# Patient Record
Sex: FEMALE | Race: WHITE | NOT HISPANIC OR LATINO | URBAN - METROPOLITAN AREA
[De-identification: names, ages, dates, MRNs, and addresses within clinical notes are randomized per-mention and may not be internally consistent; named-entity substitution may affect disease eponyms.]

---

## 2020-01-01 ENCOUNTER — HOSPITAL ENCOUNTER (INPATIENT)
Facility: HOSPITAL | Age: 80
LOS: 10 days | DRG: 177 | End: 2020-10-18
Attending: HOSPITALIST | Admitting: HOSPITALIST
Payer: MEDICARE

## 2020-01-01 VITALS
WEIGHT: 134.06 LBS | SYSTOLIC BLOOD PRESSURE: 61 MMHG | RESPIRATION RATE: 18 BRPM | HEIGHT: 64 IN | BODY MASS INDEX: 22.89 KG/M2 | OXYGEN SATURATION: 94 % | HEART RATE: 65 BPM | DIASTOLIC BLOOD PRESSURE: 25 MMHG | TEMPERATURE: 32 F

## 2020-01-01 DIAGNOSIS — Z71.89 GOALS OF CARE, COUNSELING/DISCUSSION: ICD-10-CM

## 2020-01-01 DIAGNOSIS — U07.1 PNEUMONIA DUE TO COVID-19 VIRUS: ICD-10-CM

## 2020-01-01 DIAGNOSIS — Z51.5 PALLIATIVE CARE ENCOUNTER: ICD-10-CM

## 2020-01-01 DIAGNOSIS — Z71.89 ADVANCE CARE PLANNING: ICD-10-CM

## 2020-01-01 DIAGNOSIS — J12.82 PNEUMONIA DUE TO COVID-19 VIRUS: ICD-10-CM

## 2020-01-01 DIAGNOSIS — R07.9 CHEST PAIN: ICD-10-CM

## 2020-01-01 DIAGNOSIS — R00.0 TACHYCARDIA: ICD-10-CM

## 2020-01-01 DIAGNOSIS — U07.1 COVID-19: ICD-10-CM

## 2020-01-01 DIAGNOSIS — J96.01 ACUTE RESPIRATORY FAILURE WITH HYPOXIA: ICD-10-CM

## 2020-01-01 LAB
ABO + RH BLD: NORMAL
ALBUMIN SERPL BCP-MCNC: 1.6 G/DL (ref 3.5–5.2)
ALBUMIN SERPL BCP-MCNC: 1.7 G/DL (ref 3.5–5.2)
ALBUMIN SERPL BCP-MCNC: 1.8 G/DL (ref 3.5–5.2)
ALBUMIN SERPL BCP-MCNC: 1.9 G/DL (ref 3.5–5.2)
ALBUMIN SERPL BCP-MCNC: 2 G/DL (ref 3.5–5.2)
ALBUMIN SERPL BCP-MCNC: 2 G/DL (ref 3.5–5.2)
ALBUMIN SERPL BCP-MCNC: 2.1 G/DL (ref 3.5–5.2)
ALLENS TEST: ABNORMAL
ALLENS TEST: ABNORMAL
ALP SERPL-CCNC: 145 U/L (ref 55–135)
ALP SERPL-CCNC: 151 U/L (ref 55–135)
ALP SERPL-CCNC: 153 U/L (ref 55–135)
ALP SERPL-CCNC: 169 U/L (ref 55–135)
ALP SERPL-CCNC: 182 U/L (ref 55–135)
ALP SERPL-CCNC: 216 U/L (ref 55–135)
ALP SERPL-CCNC: 225 U/L (ref 55–135)
ALP SERPL-CCNC: 274 U/L (ref 55–135)
ALP SERPL-CCNC: 277 U/L (ref 55–135)
ALT SERPL W/O P-5'-P-CCNC: 12 U/L (ref 10–44)
ALT SERPL W/O P-5'-P-CCNC: 12 U/L (ref 10–44)
ALT SERPL W/O P-5'-P-CCNC: 13 U/L (ref 10–44)
ALT SERPL W/O P-5'-P-CCNC: 13 U/L (ref 10–44)
ALT SERPL W/O P-5'-P-CCNC: 15 U/L (ref 10–44)
ALT SERPL W/O P-5'-P-CCNC: 16 U/L (ref 10–44)
ALT SERPL W/O P-5'-P-CCNC: 19 U/L (ref 10–44)
ANION GAP SERPL CALC-SCNC: 10 MMOL/L (ref 8–16)
ANION GAP SERPL CALC-SCNC: 11 MMOL/L (ref 8–16)
ANION GAP SERPL CALC-SCNC: 12 MMOL/L (ref 8–16)
ANION GAP SERPL CALC-SCNC: 13 MMOL/L (ref 8–16)
ANION GAP SERPL CALC-SCNC: 13 MMOL/L (ref 8–16)
ANION GAP SERPL CALC-SCNC: 14 MMOL/L (ref 8–16)
ANION GAP SERPL CALC-SCNC: 17 MMOL/L (ref 8–16)
ANION GAP SERPL CALC-SCNC: 18 MMOL/L (ref 8–16)
ANION GAP SERPL CALC-SCNC: 9 MMOL/L (ref 8–16)
ANISOCYTOSIS BLD QL SMEAR: SLIGHT
ASCENDING AORTA: 2.72 CM
AST SERPL-CCNC: 20 U/L (ref 10–40)
AST SERPL-CCNC: 20 U/L (ref 10–40)
AST SERPL-CCNC: 22 U/L (ref 10–40)
AST SERPL-CCNC: 25 U/L (ref 10–40)
AST SERPL-CCNC: 25 U/L (ref 10–40)
AST SERPL-CCNC: 27 U/L (ref 10–40)
AST SERPL-CCNC: 29 U/L (ref 10–40)
AST SERPL-CCNC: 34 U/L (ref 10–40)
AST SERPL-CCNC: 36 U/L (ref 10–40)
AV INDEX (PROSTH): 0.89
AV MEAN GRADIENT: 6 MMHG
AV PEAK GRADIENT: 12 MMHG
AV VALVE AREA: 2.81 CM2
AV VELOCITY RATIO: 0.71
BACTERIA BLD CULT: NORMAL
BASO STIPL BLD QL SMEAR: ABNORMAL
BASOPHILS # BLD AUTO: 0.04 K/UL (ref 0–0.2)
BASOPHILS # BLD AUTO: 0.17 K/UL (ref 0–0.2)
BASOPHILS # BLD AUTO: 0.19 K/UL (ref 0–0.2)
BASOPHILS # BLD AUTO: 0.2 K/UL (ref 0–0.2)
BASOPHILS # BLD AUTO: ABNORMAL K/UL (ref 0–0.2)
BASOPHILS NFR BLD: 0 % (ref 0–1.9)
BASOPHILS NFR BLD: 0.1 % (ref 0–1.9)
BASOPHILS NFR BLD: 0.3 % (ref 0–1.9)
BASOPHILS NFR BLD: 0.4 % (ref 0–1.9)
BASOPHILS NFR BLD: 0.4 % (ref 0–1.9)
BILIRUB SERPL-MCNC: 0.4 MG/DL (ref 0.1–1)
BILIRUB SERPL-MCNC: 0.5 MG/DL (ref 0.1–1)
BILIRUB SERPL-MCNC: 0.6 MG/DL (ref 0.1–1)
BILIRUB SERPL-MCNC: 0.7 MG/DL (ref 0.1–1)
BILIRUB SERPL-MCNC: 0.8 MG/DL (ref 0.1–1)
BLD GP AB SCN CELLS X3 SERPL QL: NORMAL
BNP SERPL-MCNC: 62 PG/ML (ref 0–99)
BSA FOR ECHO PROCEDURE: 1.66 M2
BUN SERPL-MCNC: 23 MG/DL (ref 8–23)
BUN SERPL-MCNC: 25 MG/DL (ref 8–23)
BUN SERPL-MCNC: 25 MG/DL (ref 8–23)
BUN SERPL-MCNC: 27 MG/DL (ref 8–23)
BUN SERPL-MCNC: 29 MG/DL (ref 8–23)
BUN SERPL-MCNC: 32 MG/DL (ref 8–23)
BUN SERPL-MCNC: 33 MG/DL (ref 8–23)
BUN SERPL-MCNC: 34 MG/DL (ref 8–23)
BUN SERPL-MCNC: 35 MG/DL (ref 8–23)
BURR CELLS BLD QL SMEAR: ABNORMAL
BURR CELLS BLD QL SMEAR: ABNORMAL
CALCIUM SERPL-MCNC: 6.7 MG/DL (ref 8.7–10.5)
CALCIUM SERPL-MCNC: 6.9 MG/DL (ref 8.7–10.5)
CALCIUM SERPL-MCNC: 7 MG/DL (ref 8.7–10.5)
CALCIUM SERPL-MCNC: 7.1 MG/DL (ref 8.7–10.5)
CALCIUM SERPL-MCNC: 7.2 MG/DL (ref 8.7–10.5)
CALCIUM SERPL-MCNC: 7.4 MG/DL (ref 8.7–10.5)
CHLORIDE SERPL-SCNC: 106 MMOL/L (ref 95–110)
CHLORIDE SERPL-SCNC: 107 MMOL/L (ref 95–110)
CHLORIDE SERPL-SCNC: 107 MMOL/L (ref 95–110)
CHLORIDE SERPL-SCNC: 108 MMOL/L (ref 95–110)
CHLORIDE SERPL-SCNC: 110 MMOL/L (ref 95–110)
CK SERPL-CCNC: 127 U/L (ref 20–180)
CO2 SERPL-SCNC: 20 MMOL/L (ref 23–29)
CO2 SERPL-SCNC: 21 MMOL/L (ref 23–29)
CO2 SERPL-SCNC: 21 MMOL/L (ref 23–29)
CO2 SERPL-SCNC: 22 MMOL/L (ref 23–29)
CO2 SERPL-SCNC: 24 MMOL/L (ref 23–29)
CO2 SERPL-SCNC: 27 MMOL/L (ref 23–29)
CREAT SERPL-MCNC: 0.5 MG/DL (ref 0.5–1.4)
CREAT SERPL-MCNC: 0.6 MG/DL (ref 0.5–1.4)
CREAT SERPL-MCNC: 0.7 MG/DL (ref 0.5–1.4)
CREAT SERPL-MCNC: 0.7 MG/DL (ref 0.5–1.4)
CRP SERPL-MCNC: 117.8 MG/L (ref 0–8.2)
CRP SERPL-MCNC: 82.8 MG/L (ref 0–8.2)
CRP SERPL-MCNC: 87.2 MG/L (ref 0–8.2)
CRP SERPL-MCNC: 92.2 MG/L (ref 0–8.2)
CV ECHO LV RWT: 0.3 CM
D DIMER PPP IA.FEU-MCNC: 2.93 MG/L FEU
D DIMER PPP IA.FEU-MCNC: 3.16 MG/L FEU
D DIMER PPP IA.FEU-MCNC: 3.71 MG/L FEU
D DIMER PPP IA.FEU-MCNC: 3.86 MG/L FEU
D DIMER PPP IA.FEU-MCNC: 5.59 MG/L FEU
DELSYS: ABNORMAL
DIFFERENTIAL METHOD: ABNORMAL
DOHLE BOD BLD QL SMEAR: PRESENT
DOP CALC AO PEAK VEL: 1.74 M/S
DOP CALC AO VTI: 33.28 CM
DOP CALC LVOT AREA: 3.2 CM2
DOP CALC LVOT DIAMETER: 2.01 CM
DOP CALC LVOT PEAK VEL: 1.23 M/S
DOP CALC LVOT STROKE VOLUME: 93.5 CM3
DOP CALCLVOT PEAK VEL VTI: 29.48 CM
E WAVE DECELERATION TIME: 203.96 MSEC
E/A RATIO: 0.76
E/E' RATIO: 18.67 M/S
ECHO LV POSTERIOR WALL: 0.6 CM (ref 0.6–1.1)
EOSINOPHIL # BLD AUTO: 0.1 K/UL (ref 0–0.5)
EOSINOPHIL # BLD AUTO: 0.1 K/UL (ref 0–0.5)
EOSINOPHIL # BLD AUTO: 0.2 K/UL (ref 0–0.5)
EOSINOPHIL # BLD AUTO: 0.2 K/UL (ref 0–0.5)
EOSINOPHIL # BLD AUTO: ABNORMAL K/UL (ref 0–0.5)
EOSINOPHIL NFR BLD: 0 % (ref 0–8)
EOSINOPHIL NFR BLD: 0.1 % (ref 0–8)
EOSINOPHIL NFR BLD: 0.2 % (ref 0–8)
EOSINOPHIL NFR BLD: 0.3 % (ref 0–8)
EOSINOPHIL NFR BLD: 0.4 % (ref 0–8)
EOSINOPHIL NFR BLD: 1 % (ref 0–8)
EP: 5
ERYTHROCYTE [DISTWIDTH] IN BLOOD BY AUTOMATED COUNT: 14.5 % (ref 11.5–14.5)
ERYTHROCYTE [DISTWIDTH] IN BLOOD BY AUTOMATED COUNT: 14.6 % (ref 11.5–14.5)
ERYTHROCYTE [DISTWIDTH] IN BLOOD BY AUTOMATED COUNT: 14.6 % (ref 11.5–14.5)
ERYTHROCYTE [DISTWIDTH] IN BLOOD BY AUTOMATED COUNT: 15 % (ref 11.5–14.5)
ERYTHROCYTE [DISTWIDTH] IN BLOOD BY AUTOMATED COUNT: 15.4 % (ref 11.5–14.5)
ERYTHROCYTE [DISTWIDTH] IN BLOOD BY AUTOMATED COUNT: 15.5 % (ref 11.5–14.5)
ERYTHROCYTE [DISTWIDTH] IN BLOOD BY AUTOMATED COUNT: 16.2 % (ref 11.5–14.5)
ERYTHROCYTE [DISTWIDTH] IN BLOOD BY AUTOMATED COUNT: 16.4 % (ref 11.5–14.5)
ERYTHROCYTE [DISTWIDTH] IN BLOOD BY AUTOMATED COUNT: 16.8 % (ref 11.5–14.5)
ERYTHROCYTE [SEDIMENTATION RATE] IN BLOOD BY WESTERGREN METHOD: 20 MM/H
ERYTHROCYTE [SEDIMENTATION RATE] IN BLOOD BY WESTERGREN METHOD: 35 MM/HR (ref 0–36)
EST. GFR  (AFRICAN AMERICAN): >60 ML/MIN/1.73 M^2
EST. GFR  (NON AFRICAN AMERICAN): >60 ML/MIN/1.73 M^2
ESTIMATED AVG GLUCOSE: 126 MG/DL (ref 68–131)
FERRITIN SERPL-MCNC: 1521 NG/ML (ref 20–300)
FERRITIN SERPL-MCNC: 828 NG/ML (ref 20–300)
FERRITIN SERPL-MCNC: 841 NG/ML (ref 20–300)
FERRITIN SERPL-MCNC: 925 NG/ML (ref 20–300)
FERRITIN SERPL-MCNC: 987 NG/ML (ref 20–300)
FIO2: 80
FRACTIONAL SHORTENING: 41 % (ref 28–44)
GLUCOSE SERPL-MCNC: 113 MG/DL (ref 70–110)
GLUCOSE SERPL-MCNC: 114 MG/DL (ref 70–110)
GLUCOSE SERPL-MCNC: 127 MG/DL (ref 70–110)
GLUCOSE SERPL-MCNC: 156 MG/DL (ref 70–110)
GLUCOSE SERPL-MCNC: 170 MG/DL (ref 70–110)
GLUCOSE SERPL-MCNC: 77 MG/DL (ref 70–110)
GLUCOSE SERPL-MCNC: 85 MG/DL (ref 70–110)
GLUCOSE SERPL-MCNC: 87 MG/DL (ref 70–110)
GLUCOSE SERPL-MCNC: 97 MG/DL (ref 70–110)
HBA1C MFR BLD HPLC: 6 % (ref 4–5.6)
HCO3 UR-SCNC: 21.4 MMOL/L (ref 24–28)
HCO3 UR-SCNC: 28.2 MMOL/L (ref 24–28)
HCT VFR BLD AUTO: 32.1 % (ref 37–48.5)
HCT VFR BLD AUTO: 34.2 % (ref 37–48.5)
HCT VFR BLD AUTO: 34.7 % (ref 37–48.5)
HCT VFR BLD AUTO: 35.8 % (ref 37–48.5)
HCT VFR BLD AUTO: 36 % (ref 37–48.5)
HCT VFR BLD AUTO: 36.1 % (ref 37–48.5)
HCT VFR BLD AUTO: 36.9 % (ref 37–48.5)
HCT VFR BLD AUTO: 37.1 % (ref 37–48.5)
HCT VFR BLD AUTO: 39.4 % (ref 37–48.5)
HGB BLD-MCNC: 10.7 G/DL (ref 12–16)
HGB BLD-MCNC: 11 G/DL (ref 12–16)
HGB BLD-MCNC: 11 G/DL (ref 12–16)
HGB BLD-MCNC: 11.1 G/DL (ref 12–16)
HGB BLD-MCNC: 11.1 G/DL (ref 12–16)
HGB BLD-MCNC: 11.3 G/DL (ref 12–16)
HGB BLD-MCNC: 11.5 G/DL (ref 12–16)
HGB BLD-MCNC: 12.1 G/DL (ref 12–16)
HGB BLD-MCNC: 9.7 G/DL (ref 12–16)
HYPOCHROMIA BLD QL SMEAR: ABNORMAL
IMM GRANULOCYTES # BLD AUTO: 1.61 K/UL (ref 0–0.04)
IMM GRANULOCYTES # BLD AUTO: 1.9 K/UL (ref 0–0.04)
IMM GRANULOCYTES # BLD AUTO: 2.04 K/UL (ref 0–0.04)
IMM GRANULOCYTES # BLD AUTO: 2.11 K/UL (ref 0–0.04)
IMM GRANULOCYTES # BLD AUTO: ABNORMAL K/UL (ref 0–0.04)
IMM GRANULOCYTES NFR BLD AUTO: 3.3 % (ref 0–0.5)
IMM GRANULOCYTES NFR BLD AUTO: 3.8 % (ref 0–0.5)
IMM GRANULOCYTES NFR BLD AUTO: 3.8 % (ref 0–0.5)
IMM GRANULOCYTES NFR BLD AUTO: 4 % (ref 0–0.5)
IMM GRANULOCYTES NFR BLD AUTO: ABNORMAL % (ref 0–0.5)
INR PPP: 1.3 (ref 0.8–1.2)
INTERVENTRICULAR SEPTUM: 0.8 CM (ref 0.6–1.1)
IP: 10
LA MAJOR: 3.78 CM
LA MINOR: 3.64 CM
LA WIDTH: 2.89 CM
LACTATE SERPL-SCNC: 3.1 MMOL/L (ref 0.5–2.2)
LACTATE SERPL-SCNC: 3.2 MMOL/L (ref 0.5–2.2)
LACTATE SERPL-SCNC: 3.3 MMOL/L (ref 0.5–2.2)
LDH SERPL L TO P-CCNC: 1115 U/L (ref 110–260)
LDH SERPL L TO P-CCNC: 1542 U/L (ref 110–260)
LDH SERPL L TO P-CCNC: 1550 U/L (ref 110–260)
LDH SERPL L TO P-CCNC: 788 U/L (ref 110–260)
LDH SERPL L TO P-CCNC: 899 U/L (ref 110–260)
LEFT ATRIUM SIZE: 2.33 CM
LEFT ATRIUM VOLUME INDEX: 12.9 ML/M2
LEFT ATRIUM VOLUME: 21.23 CM3
LEFT INTERNAL DIMENSION IN SYSTOLE: 2.36 CM (ref 2.1–4)
LEFT VENTRICLE DIASTOLIC VOLUME INDEX: 47.9 ML/M2
LEFT VENTRICLE DIASTOLIC VOLUME: 79.03 ML
LEFT VENTRICLE MASS INDEX: 47 G/M2
LEFT VENTRICLE SYSTOLIC VOLUME INDEX: 11.7 ML/M2
LEFT VENTRICLE SYSTOLIC VOLUME: 19.27 ML
LEFT VENTRICULAR INTERNAL DIMENSION IN DIASTOLE: 4 CM (ref 3.5–6)
LEFT VENTRICULAR MASS: 78.36 G
LV LATERAL E/E' RATIO: 16.8 M/S
LV SEPTAL E/E' RATIO: 21 M/S
LYMPHOCYTES # BLD AUTO: 0.9 K/UL (ref 1–4.8)
LYMPHOCYTES # BLD AUTO: 0.9 K/UL (ref 1–4.8)
LYMPHOCYTES # BLD AUTO: 1.2 K/UL (ref 1–4.8)
LYMPHOCYTES # BLD AUTO: 1.2 K/UL (ref 1–4.8)
LYMPHOCYTES # BLD AUTO: ABNORMAL K/UL (ref 1–4.8)
LYMPHOCYTES NFR BLD: 1 % (ref 18–48)
LYMPHOCYTES NFR BLD: 1.8 % (ref 18–48)
LYMPHOCYTES NFR BLD: 1.9 % (ref 18–48)
LYMPHOCYTES NFR BLD: 2.1 % (ref 18–48)
LYMPHOCYTES NFR BLD: 2.3 % (ref 18–48)
LYMPHOCYTES NFR BLD: 3 % (ref 18–48)
LYMPHOCYTES NFR BLD: 7 % (ref 18–48)
MAGNESIUM SERPL-MCNC: 2.4 MG/DL (ref 1.6–2.6)
MAGNESIUM SERPL-MCNC: 2.6 MG/DL (ref 1.6–2.6)
MAGNESIUM SERPL-MCNC: 2.7 MG/DL (ref 1.6–2.6)
MAGNESIUM SERPL-MCNC: 2.8 MG/DL (ref 1.6–2.6)
MAGNESIUM SERPL-MCNC: 2.9 MG/DL (ref 1.6–2.6)
MCH RBC QN AUTO: 27.9 PG (ref 27–31)
MCH RBC QN AUTO: 28.2 PG (ref 27–31)
MCH RBC QN AUTO: 28.6 PG (ref 27–31)
MCH RBC QN AUTO: 28.7 PG (ref 27–31)
MCH RBC QN AUTO: 28.7 PG (ref 27–31)
MCH RBC QN AUTO: 28.9 PG (ref 27–31)
MCH RBC QN AUTO: 29 PG (ref 27–31)
MCH RBC QN AUTO: 29 PG (ref 27–31)
MCH RBC QN AUTO: 29.3 PG (ref 27–31)
MCHC RBC AUTO-ENTMCNC: 29 G/DL (ref 32–36)
MCHC RBC AUTO-ENTMCNC: 30.2 G/DL (ref 32–36)
MCHC RBC AUTO-ENTMCNC: 30.5 G/DL (ref 32–36)
MCHC RBC AUTO-ENTMCNC: 30.7 G/DL (ref 32–36)
MCHC RBC AUTO-ENTMCNC: 30.8 G/DL (ref 32–36)
MCHC RBC AUTO-ENTMCNC: 31 G/DL (ref 32–36)
MCHC RBC AUTO-ENTMCNC: 31.7 G/DL (ref 32–36)
MCHC RBC AUTO-ENTMCNC: 31.9 G/DL (ref 32–36)
MCHC RBC AUTO-ENTMCNC: 32.2 G/DL (ref 32–36)
MCV RBC AUTO: 90 FL (ref 82–98)
MCV RBC AUTO: 91 FL (ref 82–98)
MCV RBC AUTO: 92 FL (ref 82–98)
MCV RBC AUTO: 93 FL (ref 82–98)
MCV RBC AUTO: 95 FL (ref 82–98)
MCV RBC AUTO: 96 FL (ref 82–98)
MCV RBC AUTO: 99 FL (ref 82–98)
METAMYELOCYTES NFR BLD MANUAL: 1 %
MIN VOL: 15.5
MODE: ABNORMAL
MONOCYTES # BLD AUTO: 1.6 K/UL (ref 0.3–1)
MONOCYTES # BLD AUTO: 1.8 K/UL (ref 0.3–1)
MONOCYTES # BLD AUTO: 2.1 K/UL (ref 0.3–1)
MONOCYTES # BLD AUTO: 2.4 K/UL (ref 0.3–1)
MONOCYTES # BLD AUTO: ABNORMAL K/UL (ref 0.3–1)
MONOCYTES NFR BLD: 1 % (ref 4–15)
MONOCYTES NFR BLD: 2 % (ref 4–15)
MONOCYTES NFR BLD: 2.5 % (ref 4–15)
MONOCYTES NFR BLD: 3 % (ref 4–15)
MONOCYTES NFR BLD: 3.2 % (ref 4–15)
MONOCYTES NFR BLD: 3.7 % (ref 4–15)
MONOCYTES NFR BLD: 4 % (ref 4–15)
MONOCYTES NFR BLD: 4.2 % (ref 4–15)
MONOCYTES NFR BLD: 4.2 % (ref 4–15)
MV PEAK A VEL: 1.1 M/S
MV PEAK E VEL: 0.84 M/S
MV STENOSIS PRESSURE HALF TIME: 59.15 MS
MV VALVE AREA P 1/2 METHOD: 3.72 CM2
NEUTROPHILS # BLD AUTO: 44.2 K/UL (ref 1.8–7.7)
NEUTROPHILS # BLD AUTO: 44.9 K/UL (ref 1.8–7.7)
NEUTROPHILS # BLD AUTO: 46.1 K/UL (ref 1.8–7.7)
NEUTROPHILS # BLD AUTO: 50 K/UL (ref 1.8–7.7)
NEUTROPHILS # BLD AUTO: ABNORMAL K/UL (ref 1.8–7.7)
NEUTROPHILS NFR BLD: 88 % (ref 38–73)
NEUTROPHILS NFR BLD: 89.2 % (ref 38–73)
NEUTROPHILS NFR BLD: 89.7 % (ref 38–73)
NEUTROPHILS NFR BLD: 90.3 % (ref 38–73)
NEUTROPHILS NFR BLD: 90.3 % (ref 38–73)
NEUTROPHILS NFR BLD: 91 % (ref 38–73)
NEUTROPHILS NFR BLD: 93 % (ref 38–73)
NEUTROPHILS NFR BLD: 96 % (ref 38–73)
NEUTROPHILS NFR BLD: 98 % (ref 38–73)
NEUTS BAND NFR BLD MANUAL: 0.5 %
NEUTS BAND NFR BLD MANUAL: 1 %
NEUTS BAND NFR BLD MANUAL: 2 %
NEUTS BAND NFR BLD MANUAL: 3 %
NRBC BLD-RTO: 0 /100 WBC
OVALOCYTES BLD QL SMEAR: ABNORMAL
PATH REV BLD -IMP: NORMAL
PCO2 BLDA: 27.1 MMHG (ref 35–45)
PCO2 BLDA: 46.4 MMHG (ref 35–45)
PH SMN: 7.39 [PH] (ref 7.35–7.45)
PH SMN: 7.51 [PH] (ref 7.35–7.45)
PHOSPHATE SERPL-MCNC: 1 MG/DL (ref 2.7–4.5)
PHOSPHATE SERPL-MCNC: 1.4 MG/DL (ref 2.7–4.5)
PHOSPHATE SERPL-MCNC: 1.5 MG/DL (ref 2.7–4.5)
PHOSPHATE SERPL-MCNC: 1.6 MG/DL (ref 2.7–4.5)
PHOSPHATE SERPL-MCNC: 1.9 MG/DL (ref 2.7–4.5)
PHOSPHATE SERPL-MCNC: 2.1 MG/DL (ref 2.7–4.5)
PHOSPHATE SERPL-MCNC: 2.1 MG/DL (ref 2.7–4.5)
PHOSPHATE SERPL-MCNC: 2.5 MG/DL (ref 2.7–4.5)
PHOSPHATE SERPL-MCNC: 2.9 MG/DL (ref 2.7–4.5)
PISA TR MAX VEL: 3.27 M/S
PLATELET # BLD AUTO: 202 K/UL (ref 150–350)
PLATELET # BLD AUTO: 204 K/UL (ref 150–350)
PLATELET # BLD AUTO: 207 K/UL (ref 150–350)
PLATELET # BLD AUTO: 216 K/UL (ref 150–350)
PLATELET # BLD AUTO: 223 K/UL (ref 150–350)
PLATELET # BLD AUTO: 225 K/UL (ref 150–350)
PLATELET # BLD AUTO: 241 K/UL (ref 150–350)
PLATELET # BLD AUTO: 253 K/UL (ref 150–350)
PLATELET # BLD AUTO: 254 K/UL (ref 150–350)
PLATELET BLD QL SMEAR: ABNORMAL
PMV BLD AUTO: 10.4 FL (ref 9.2–12.9)
PMV BLD AUTO: 11 FL (ref 9.2–12.9)
PMV BLD AUTO: 11.3 FL (ref 9.2–12.9)
PMV BLD AUTO: 11.3 FL (ref 9.2–12.9)
PMV BLD AUTO: 11.7 FL (ref 9.2–12.9)
PMV BLD AUTO: 11.8 FL (ref 9.2–12.9)
PMV BLD AUTO: 11.8 FL (ref 9.2–12.9)
PO2 BLDA: 23 MMHG (ref 40–60)
PO2 BLDA: 70 MMHG (ref 80–100)
POC BE: -2 MMOL/L
POC BE: 3 MMOL/L
POC SATURATED O2: 39 % (ref 95–100)
POC SATURATED O2: 96 % (ref 95–100)
POC TCO2: 22 MMOL/L (ref 23–27)
POC TCO2: 30 MMOL/L (ref 24–29)
POCT GLUCOSE: 203 MG/DL (ref 70–110)
POIKILOCYTOSIS BLD QL SMEAR: SLIGHT
POLYCHROMASIA BLD QL SMEAR: ABNORMAL
POTASSIUM SERPL-SCNC: 3.6 MMOL/L (ref 3.5–5.1)
POTASSIUM SERPL-SCNC: 4 MMOL/L (ref 3.5–5.1)
POTASSIUM SERPL-SCNC: 4.2 MMOL/L (ref 3.5–5.1)
POTASSIUM SERPL-SCNC: 4.2 MMOL/L (ref 3.5–5.1)
POTASSIUM SERPL-SCNC: 4.4 MMOL/L (ref 3.5–5.1)
POTASSIUM SERPL-SCNC: 4.5 MMOL/L (ref 3.5–5.1)
POTASSIUM SERPL-SCNC: 4.6 MMOL/L (ref 3.5–5.1)
POTASSIUM SERPL-SCNC: 4.6 MMOL/L (ref 3.5–5.1)
POTASSIUM SERPL-SCNC: 4.7 MMOL/L (ref 3.5–5.1)
PREALB SERPL-MCNC: 10 MG/DL (ref 20–43)
PROCALCITONIN SERPL IA-MCNC: 0.09 NG/ML
PROT SERPL-MCNC: 5.3 G/DL (ref 6–8.4)
PROT SERPL-MCNC: 5.4 G/DL (ref 6–8.4)
PROT SERPL-MCNC: 5.5 G/DL (ref 6–8.4)
PROT SERPL-MCNC: 5.5 G/DL (ref 6–8.4)
PROT SERPL-MCNC: 5.6 G/DL (ref 6–8.4)
PROT SERPL-MCNC: 5.6 G/DL (ref 6–8.4)
PROT SERPL-MCNC: 5.7 G/DL (ref 6–8.4)
PROT SERPL-MCNC: 5.8 G/DL (ref 6–8.4)
PROT SERPL-MCNC: 6.3 G/DL (ref 6–8.4)
PROTHROMBIN TIME: 14.5 SEC (ref 9–12.5)
RA PRESSURE: 3 MMHG
RBC # BLD AUTO: 3.34 M/UL (ref 4–5.4)
RBC # BLD AUTO: 3.73 M/UL (ref 4–5.4)
RBC # BLD AUTO: 3.75 M/UL (ref 4–5.4)
RBC # BLD AUTO: 3.81 M/UL (ref 4–5.4)
RBC # BLD AUTO: 3.87 M/UL (ref 4–5.4)
RBC # BLD AUTO: 3.89 M/UL (ref 4–5.4)
RBC # BLD AUTO: 3.93 M/UL (ref 4–5.4)
RBC # BLD AUTO: 4.02 M/UL (ref 4–5.4)
RBC # BLD AUTO: 4.33 M/UL (ref 4–5.4)
RV TISSUE DOPPLER FREE WALL SYSTOLIC VELOCITY 1 (APICAL 4 CHAMBER VIEW): 12.66 CM/S
SAMPLE: ABNORMAL
SAMPLE: ABNORMAL
SINUS: 3.09 CM
SITE: ABNORMAL
SITE: ABNORMAL
SODIUM SERPL-SCNC: 140 MMOL/L (ref 136–145)
SODIUM SERPL-SCNC: 141 MMOL/L (ref 136–145)
SODIUM SERPL-SCNC: 141 MMOL/L (ref 136–145)
SODIUM SERPL-SCNC: 142 MMOL/L (ref 136–145)
SODIUM SERPL-SCNC: 144 MMOL/L (ref 136–145)
SODIUM SERPL-SCNC: 145 MMOL/L (ref 136–145)
SODIUM SERPL-SCNC: 147 MMOL/L (ref 136–145)
SP02: 94
SPONT RATE: 30
STJ: 2.41 CM
TDI LATERAL: 0.05 M/S
TDI SEPTAL: 0.04 M/S
TDI: 0.05 M/S
TOXIC GRANULES BLD QL SMEAR: PRESENT
TR MAX PG: 43 MMHG
TRIGL SERPL-MCNC: 419 MG/DL (ref 30–150)
TROPONIN I SERPL DL<=0.01 NG/ML-MCNC: <0.006 NG/ML (ref 0–0.03)
TV REST PULMONARY ARTERY PRESSURE: 46 MMHG
VANCOMYCIN TROUGH SERPL-MCNC: 16.9 UG/ML (ref 10–22)
VANCOMYCIN TROUGH SERPL-MCNC: 4.5 UG/ML (ref 10–22)
WBC # BLD AUTO: 48.98 K/UL (ref 3.9–12.7)
WBC # BLD AUTO: 49.7 K/UL (ref 3.9–12.7)
WBC # BLD AUTO: 51.35 K/UL (ref 3.9–12.7)
WBC # BLD AUTO: 55.55 K/UL (ref 3.9–12.7)
WBC # BLD AUTO: 55.97 K/UL (ref 3.9–12.7)
WBC # BLD AUTO: 59.07 K/UL (ref 3.9–12.7)
WBC # BLD AUTO: 61.43 K/UL (ref 3.9–12.7)
WBC # BLD AUTO: 68.75 K/UL (ref 3.9–12.7)
WBC # BLD AUTO: 79.21 K/UL (ref 3.9–12.7)

## 2020-01-01 PROCEDURE — 36415 COLL VENOUS BLD VENIPUNCTURE: CPT

## 2020-01-01 PROCEDURE — 63600175 PHARM REV CODE 636 W HCPCS: Performed by: STUDENT IN AN ORGANIZED HEALTH CARE EDUCATION/TRAINING PROGRAM

## 2020-01-01 PROCEDURE — 20600001 HC STEP DOWN PRIVATE ROOM

## 2020-01-01 PROCEDURE — 25000242 PHARM REV CODE 250 ALT 637 W/ HCPCS: Performed by: STUDENT IN AN ORGANIZED HEALTH CARE EDUCATION/TRAINING PROGRAM

## 2020-01-01 PROCEDURE — 99900035 HC TECH TIME PER 15 MIN (STAT)

## 2020-01-01 PROCEDURE — 92526 ORAL FUNCTION THERAPY: CPT

## 2020-01-01 PROCEDURE — 27100171 HC OXYGEN HIGH FLOW UP TO 24 HOURS

## 2020-01-01 PROCEDURE — 36600 WITHDRAWAL OF ARTERIAL BLOOD: CPT

## 2020-01-01 PROCEDURE — 83605 ASSAY OF LACTIC ACID: CPT

## 2020-01-01 PROCEDURE — 25000003 PHARM REV CODE 250: Performed by: STUDENT IN AN ORGANIZED HEALTH CARE EDUCATION/TRAINING PROGRAM

## 2020-01-01 PROCEDURE — 94761 N-INVAS EAR/PLS OXIMETRY MLT: CPT

## 2020-01-01 PROCEDURE — 82728 ASSAY OF FERRITIN: CPT

## 2020-01-01 PROCEDURE — 94660 CPAP INITIATION&MGMT: CPT

## 2020-01-01 PROCEDURE — 27000221 HC OXYGEN, UP TO 24 HOURS

## 2020-01-01 PROCEDURE — 27000190 HC CPAP FULL FACE MASK W/VALVE

## 2020-01-01 PROCEDURE — 80053 COMPREHEN METABOLIC PANEL: CPT

## 2020-01-01 PROCEDURE — 63600175 PHARM REV CODE 636 W HCPCS: Performed by: INTERNAL MEDICINE

## 2020-01-01 PROCEDURE — 99233 PR SUBSEQUENT HOSPITAL CARE,LEVL III: ICD-10-PCS | Mod: GC,,, | Performed by: INTERNAL MEDICINE

## 2020-01-01 PROCEDURE — 84100 ASSAY OF PHOSPHORUS: CPT

## 2020-01-01 PROCEDURE — 83735 ASSAY OF MAGNESIUM: CPT

## 2020-01-01 PROCEDURE — 99233 SBSQ HOSP IP/OBS HIGH 50: CPT | Mod: GC,,, | Performed by: INTERNAL MEDICINE

## 2020-01-01 PROCEDURE — 85379 FIBRIN DEGRADATION QUANT: CPT

## 2020-01-01 PROCEDURE — 93005 ELECTROCARDIOGRAM TRACING: CPT

## 2020-01-01 PROCEDURE — 83036 HEMOGLOBIN GLYCOSYLATED A1C: CPT

## 2020-01-01 PROCEDURE — 99233 SBSQ HOSP IP/OBS HIGH 50: CPT | Mod: ,,, | Performed by: CLINICAL NURSE SPECIALIST

## 2020-01-01 PROCEDURE — 94640 AIRWAY INHALATION TREATMENT: CPT

## 2020-01-01 PROCEDURE — 99223 PR INITIAL HOSPITAL CARE,LEVL III: ICD-10-PCS | Mod: AI,GC,, | Performed by: HOSPITALIST

## 2020-01-01 PROCEDURE — 86850 RBC ANTIBODY SCREEN: CPT

## 2020-01-01 PROCEDURE — 25000003 PHARM REV CODE 250: Performed by: INTERNAL MEDICINE

## 2020-01-01 PROCEDURE — 97803 MED NUTRITION INDIV SUBSEQ: CPT

## 2020-01-01 PROCEDURE — 99223 PR INITIAL HOSPITAL CARE,LEVL III: ICD-10-PCS | Mod: ,,, | Performed by: CLINICAL NURSE SPECIALIST

## 2020-01-01 PROCEDURE — 84478 ASSAY OF TRIGLYCERIDES: CPT

## 2020-01-01 PROCEDURE — 93010 EKG 12-LEAD: ICD-10-PCS | Mod: ,,, | Performed by: INTERNAL MEDICINE

## 2020-01-01 PROCEDURE — 99223 1ST HOSP IP/OBS HIGH 75: CPT | Mod: AI,GC,, | Performed by: HOSPITALIST

## 2020-01-01 PROCEDURE — 83880 ASSAY OF NATRIURETIC PEPTIDE: CPT

## 2020-01-01 PROCEDURE — 99497 ADVNCD CARE PLAN 30 MIN: CPT | Mod: ,,, | Performed by: CLINICAL NURSE SPECIALIST

## 2020-01-01 PROCEDURE — 99233 PR SUBSEQUENT HOSPITAL CARE,LEVL III: ICD-10-PCS | Mod: ,,, | Performed by: CLINICAL NURSE SPECIALIST

## 2020-01-01 PROCEDURE — 99499 NO LOS: ICD-10-PCS | Mod: GC,,, | Performed by: INTERNAL MEDICINE

## 2020-01-01 PROCEDURE — 85007 BL SMEAR W/DIFF WBC COUNT: CPT

## 2020-01-01 PROCEDURE — 83615 LACTATE (LD) (LDH) ENZYME: CPT

## 2020-01-01 PROCEDURE — 85610 PROTHROMBIN TIME: CPT

## 2020-01-01 PROCEDURE — A4217 STERILE WATER/SALINE, 500 ML: HCPCS | Performed by: INTERNAL MEDICINE

## 2020-01-01 PROCEDURE — 84100 ASSAY OF PHOSPHORUS: CPT | Mod: 91

## 2020-01-01 PROCEDURE — 80202 ASSAY OF VANCOMYCIN: CPT

## 2020-01-01 PROCEDURE — 99497 PR ADVNCD CARE PLAN 30 MIN: ICD-10-PCS | Mod: ,,, | Performed by: CLINICAL NURSE SPECIALIST

## 2020-01-01 PROCEDURE — 97535 SELF CARE MNGMENT TRAINING: CPT

## 2020-01-01 PROCEDURE — 82803 BLOOD GASES ANY COMBINATION: CPT

## 2020-01-01 PROCEDURE — B4185 PARENTERAL SOL 10 GM LIPIDS: HCPCS | Performed by: INTERNAL MEDICINE

## 2020-01-01 PROCEDURE — 85025 COMPLETE CBC W/AUTO DIFF WBC: CPT

## 2020-01-01 PROCEDURE — 25000242 PHARM REV CODE 250 ALT 637 W/ HCPCS: Performed by: HOSPITALIST

## 2020-01-01 PROCEDURE — 93010 ELECTROCARDIOGRAM REPORT: CPT | Mod: ,,, | Performed by: INTERNAL MEDICINE

## 2020-01-01 PROCEDURE — A4217 STERILE WATER/SALINE, 500 ML: HCPCS | Performed by: STUDENT IN AN ORGANIZED HEALTH CARE EDUCATION/TRAINING PROGRAM

## 2020-01-01 PROCEDURE — 83605 ASSAY OF LACTIC ACID: CPT | Mod: 91

## 2020-01-01 PROCEDURE — 86140 C-REACTIVE PROTEIN: CPT

## 2020-01-01 PROCEDURE — 99223 1ST HOSP IP/OBS HIGH 75: CPT | Mod: ,,, | Performed by: CLINICAL NURSE SPECIALIST

## 2020-01-01 PROCEDURE — 94799 UNLISTED PULMONARY SVC/PX: CPT

## 2020-01-01 PROCEDURE — 84145 PROCALCITONIN (PCT): CPT

## 2020-01-01 PROCEDURE — 99499 UNLISTED E&M SERVICE: CPT | Mod: GC,,, | Performed by: INTERNAL MEDICINE

## 2020-01-01 PROCEDURE — 85027 COMPLETE CBC AUTOMATED: CPT

## 2020-01-01 PROCEDURE — 82550 ASSAY OF CK (CPK): CPT

## 2020-01-01 PROCEDURE — 85652 RBC SED RATE AUTOMATED: CPT

## 2020-01-01 PROCEDURE — 84134 ASSAY OF PREALBUMIN: CPT

## 2020-01-01 PROCEDURE — 99239 HOSP IP/OBS DSCHRG MGMT >30: CPT | Mod: GC,,, | Performed by: INTERNAL MEDICINE

## 2020-01-01 PROCEDURE — 84484 ASSAY OF TROPONIN QUANT: CPT

## 2020-01-01 PROCEDURE — 85060 PATHOLOGIST REVIEW: ICD-10-PCS | Mod: ,,, | Performed by: PATHOLOGY

## 2020-01-01 PROCEDURE — 85060 BLOOD SMEAR INTERPRETATION: CPT | Mod: ,,, | Performed by: PATHOLOGY

## 2020-01-01 PROCEDURE — 94760 N-INVAS EAR/PLS OXIMETRY 1: CPT

## 2020-01-01 PROCEDURE — 92610 EVALUATE SWALLOWING FUNCTION: CPT

## 2020-01-01 PROCEDURE — 99239 PR HOSPITAL DISCHARGE DAY,>30 MIN: ICD-10-PCS | Mod: GC,,, | Performed by: INTERNAL MEDICINE

## 2020-01-01 PROCEDURE — 87040 BLOOD CULTURE FOR BACTERIA: CPT

## 2020-01-01 RX ORDER — LORAZEPAM 2 MG/ML
0.5 INJECTION INTRAMUSCULAR EVERY 4 HOURS PRN
Status: DISCONTINUED | OUTPATIENT
Start: 2020-01-01 | End: 2020-01-01 | Stop reason: HOSPADM

## 2020-01-01 RX ORDER — IBUPROFEN 200 MG
24 TABLET ORAL
Status: DISCONTINUED | OUTPATIENT
Start: 2020-01-01 | End: 2020-01-01 | Stop reason: HOSPADM

## 2020-01-01 RX ORDER — CEFEPIME HYDROCHLORIDE 2 G/1
2 INJECTION, POWDER, FOR SOLUTION INTRAVENOUS
Status: DISCONTINUED | OUTPATIENT
Start: 2020-01-01 | End: 2020-01-01

## 2020-01-01 RX ORDER — MORPHINE SULFATE 2 MG/ML
2 INJECTION, SOLUTION INTRAMUSCULAR; INTRAVENOUS
Status: DISCONTINUED | OUTPATIENT
Start: 2020-01-01 | End: 2020-01-01 | Stop reason: HOSPADM

## 2020-01-01 RX ORDER — IPRATROPIUM BROMIDE AND ALBUTEROL SULFATE 2.5; .5 MG/3ML; MG/3ML
3 SOLUTION RESPIRATORY (INHALATION) EVERY 4 HOURS PRN
Status: DISCONTINUED | OUTPATIENT
Start: 2020-01-01 | End: 2020-01-01 | Stop reason: HOSPADM

## 2020-01-01 RX ORDER — MORPHINE SULFATE 2 MG/ML
5 INJECTION, SOLUTION INTRAMUSCULAR; INTRAVENOUS EVERY 4 HOURS PRN
Status: DISCONTINUED | OUTPATIENT
Start: 2020-01-01 | End: 2020-01-01

## 2020-01-01 RX ORDER — BISACODYL 10 MG
10 SUPPOSITORY, RECTAL RECTAL DAILY PRN
Status: DISCONTINUED | OUTPATIENT
Start: 2020-01-01 | End: 2020-01-01 | Stop reason: HOSPADM

## 2020-01-01 RX ORDER — LORAZEPAM 2 MG/ML
2 INJECTION INTRAMUSCULAR
Status: DISCONTINUED | OUTPATIENT
Start: 2020-01-01 | End: 2020-01-01

## 2020-01-01 RX ORDER — MORPHINE SULFATE 2 MG/ML
1 INJECTION, SOLUTION INTRAMUSCULAR; INTRAVENOUS ONCE
Status: COMPLETED | OUTPATIENT
Start: 2020-01-01 | End: 2020-01-01

## 2020-01-01 RX ORDER — VANCOMYCIN HCL IN 5 % DEXTROSE 1G/250ML
15 PLASTIC BAG, INJECTION (ML) INTRAVENOUS
Status: DISCONTINUED | OUTPATIENT
Start: 2020-01-01 | End: 2020-01-01

## 2020-01-01 RX ORDER — MORPHINE SULFATE/0.9% NACL/PF 1 MG/ML
2.5 PLASTIC BAG, INJECTION (ML) INTRAVENOUS CONTINUOUS
Status: DISCONTINUED | OUTPATIENT
Start: 2020-01-01 | End: 2020-01-01 | Stop reason: HOSPADM

## 2020-01-01 RX ORDER — SODIUM CHLORIDE 0.9 % (FLUSH) 0.9 %
10 SYRINGE (ML) INJECTION
Status: DISCONTINUED | OUTPATIENT
Start: 2020-01-01 | End: 2020-01-01 | Stop reason: HOSPADM

## 2020-01-01 RX ORDER — LORAZEPAM 2 MG/ML
0.5 INJECTION INTRAMUSCULAR
Status: DISCONTINUED | OUTPATIENT
Start: 2020-01-01 | End: 2020-01-01

## 2020-01-01 RX ORDER — MORPHINE SULFATE 20 MG/ML
10 SOLUTION ORAL
Status: DISCONTINUED | OUTPATIENT
Start: 2020-01-01 | End: 2020-01-01

## 2020-01-01 RX ORDER — FUROSEMIDE 10 MG/ML
40 INJECTION INTRAMUSCULAR; INTRAVENOUS ONCE
Status: COMPLETED | OUTPATIENT
Start: 2020-01-01 | End: 2020-01-01

## 2020-01-01 RX ORDER — ASCORBIC ACID 500 MG
500 TABLET ORAL 2 TIMES DAILY
Status: DISCONTINUED | OUTPATIENT
Start: 2020-01-01 | End: 2020-01-01

## 2020-01-01 RX ORDER — ALPRAZOLAM 0.25 MG/1
0.25 TABLET ORAL 3 TIMES DAILY PRN
Status: DISCONTINUED | OUTPATIENT
Start: 2020-01-01 | End: 2020-01-01

## 2020-01-01 RX ORDER — MORPHINE SULFATE 2 MG/ML
2 INJECTION, SOLUTION INTRAMUSCULAR; INTRAVENOUS EVERY 4 HOURS PRN
Status: DISCONTINUED | OUTPATIENT
Start: 2020-01-01 | End: 2020-01-01

## 2020-01-01 RX ORDER — HALOPERIDOL 5 MG/ML
1 INJECTION INTRAMUSCULAR EVERY 4 HOURS PRN
Status: DISCONTINUED | OUTPATIENT
Start: 2020-01-01 | End: 2020-01-01 | Stop reason: HOSPADM

## 2020-01-01 RX ORDER — ENOXAPARIN SODIUM 100 MG/ML
40 INJECTION SUBCUTANEOUS EVERY 24 HOURS
Status: DISCONTINUED | OUTPATIENT
Start: 2020-01-01 | End: 2020-01-01

## 2020-01-01 RX ORDER — GLYCOPYRROLATE 0.2 MG/ML
0.1 INJECTION INTRAMUSCULAR; INTRAVENOUS EVERY 4 HOURS PRN
Status: DISCONTINUED | OUTPATIENT
Start: 2020-01-01 | End: 2020-01-01 | Stop reason: HOSPADM

## 2020-01-01 RX ORDER — MORPHINE SULFATE 2 MG/ML
5 INJECTION, SOLUTION INTRAMUSCULAR; INTRAVENOUS EVERY 6 HOURS PRN
Status: DISCONTINUED | OUTPATIENT
Start: 2020-01-01 | End: 2020-01-01

## 2020-01-01 RX ORDER — ONDANSETRON 2 MG/ML
8 INJECTION INTRAMUSCULAR; INTRAVENOUS EVERY 6 HOURS PRN
Status: DISCONTINUED | OUTPATIENT
Start: 2020-01-01 | End: 2020-01-01 | Stop reason: HOSPADM

## 2020-01-01 RX ORDER — GLUCAGON 1 MG
1 KIT INJECTION
Status: DISCONTINUED | OUTPATIENT
Start: 2020-01-01 | End: 2020-01-01 | Stop reason: HOSPADM

## 2020-01-01 RX ORDER — DEXTROSE MONOHYDRATE 100 MG/ML
INJECTION, SOLUTION INTRAVENOUS CONTINUOUS
Status: ACTIVE | OUTPATIENT
Start: 2020-01-01 | End: 2020-01-01

## 2020-01-01 RX ORDER — IBUPROFEN 200 MG
16 TABLET ORAL
Status: DISCONTINUED | OUTPATIENT
Start: 2020-01-01 | End: 2020-01-01 | Stop reason: HOSPADM

## 2020-01-01 RX ORDER — MORPHINE SULFATE 2 MG/ML
1 INJECTION, SOLUTION INTRAMUSCULAR; INTRAVENOUS ONCE AS NEEDED
Status: COMPLETED | OUTPATIENT
Start: 2020-01-01 | End: 2020-01-01

## 2020-01-01 RX ORDER — HYDROXYZINE HYDROCHLORIDE 10 MG/1
10 TABLET, FILM COATED ORAL ONCE
Status: COMPLETED | OUTPATIENT
Start: 2020-01-01 | End: 2020-01-01

## 2020-01-01 RX ADMIN — LORAZEPAM 0.5 MG: 2 INJECTION INTRAMUSCULAR at 01:10

## 2020-01-01 RX ADMIN — CEFEPIME 2 G: 2 INJECTION, POWDER, FOR SOLUTION INTRAVENOUS at 06:10

## 2020-01-01 RX ADMIN — CEFEPIME 2 G: 2 INJECTION, POWDER, FOR SOLUTION INTRAVENOUS at 02:10

## 2020-01-01 RX ADMIN — CEFTRIAXONE 2 G: 2 INJECTION, SOLUTION INTRAVENOUS at 10:10

## 2020-01-01 RX ADMIN — THERA TABS 1 TABLET: TAB at 10:10

## 2020-01-01 RX ADMIN — SODIUM PHOSPHATE, MONOBASIC, MONOHYDRATE 30 MMOL: 276; 142 INJECTION, SOLUTION INTRAVENOUS at 06:10

## 2020-01-01 RX ADMIN — ALTEPLASE 10 MG: 2.2 INJECTION, POWDER, LYOPHILIZED, FOR SOLUTION INTRAVENOUS at 12:10

## 2020-01-01 RX ADMIN — VANCOMYCIN HYDROCHLORIDE 1000 MG: 1 INJECTION, POWDER, LYOPHILIZED, FOR SOLUTION INTRAVENOUS at 09:10

## 2020-01-01 RX ADMIN — LORAZEPAM 0.5 MG: 2 INJECTION INTRAMUSCULAR at 08:10

## 2020-01-01 RX ADMIN — SODIUM PHOSPHATE, MONOBASIC, MONOHYDRATE 15 MMOL: 276; 142 INJECTION, SOLUTION INTRAVENOUS at 01:10

## 2020-01-01 RX ADMIN — CEFEPIME 2 G: 2 INJECTION, POWDER, FOR SOLUTION INTRAVENOUS at 09:10

## 2020-01-01 RX ADMIN — CEFEPIME 2 G: 2 INJECTION, POWDER, FOR SOLUTION INTRAVENOUS at 03:10

## 2020-01-01 RX ADMIN — CEFEPIME 2 G: 2 INJECTION, POWDER, FOR SOLUTION INTRAVENOUS at 11:10

## 2020-01-01 RX ADMIN — MORPHINE SULFATE 2 MG: 2 INJECTION, SOLUTION INTRAMUSCULAR; INTRAVENOUS at 09:10

## 2020-01-01 RX ADMIN — VANCOMYCIN HYDROCHLORIDE 1000 MG: 1 INJECTION, POWDER, LYOPHILIZED, FOR SOLUTION INTRAVENOUS at 11:10

## 2020-01-01 RX ADMIN — MORPHINE SULFATE 1 MG: 2 INJECTION, SOLUTION INTRAMUSCULAR; INTRAVENOUS at 02:10

## 2020-01-01 RX ADMIN — LORAZEPAM 0.5 MG: 2 INJECTION INTRAMUSCULAR at 10:10

## 2020-01-01 RX ADMIN — OXYCODONE HYDROCHLORIDE AND ACETAMINOPHEN 500 MG: 500 TABLET ORAL at 09:10

## 2020-01-01 RX ADMIN — ENOXAPARIN SODIUM 40 MG: 40 INJECTION SUBCUTANEOUS at 06:10

## 2020-01-01 RX ADMIN — DEXTROSE MONOHYDRATE: 10 INJECTION, SOLUTION INTRAVENOUS at 12:10

## 2020-01-01 RX ADMIN — ENOXAPARIN SODIUM 40 MG: 40 INJECTION SUBCUTANEOUS at 05:10

## 2020-01-01 RX ADMIN — CEFEPIME 2 G: 2 INJECTION, POWDER, FOR SOLUTION INTRAVENOUS at 05:10

## 2020-01-01 RX ADMIN — LORAZEPAM 0.5 MG: 2 INJECTION INTRAMUSCULAR; INTRAVENOUS at 03:10

## 2020-01-01 RX ADMIN — CEFEPIME 2 G: 2 INJECTION, POWDER, FOR SOLUTION INTRAVENOUS at 12:10

## 2020-01-01 RX ADMIN — POTASSIUM PHOSPHATE, MONOBASIC AND POTASSIUM PHOSPHATE, DIBASIC 20 MMOL: 224; 236 INJECTION, SOLUTION, CONCENTRATE INTRAVENOUS at 11:10

## 2020-01-01 RX ADMIN — DEXAMETHASONE 6 MG: 4 TABLET ORAL at 09:10

## 2020-01-01 RX ADMIN — DORNASE ALFA 5 MG: 1 SOLUTION RESPIRATORY (INHALATION) at 09:10

## 2020-01-01 RX ADMIN — IPRATROPIUM BROMIDE AND ALBUTEROL SULFATE 3 ML: .5; 2.5 SOLUTION RESPIRATORY (INHALATION) at 08:10

## 2020-01-01 RX ADMIN — VANCOMYCIN HYDROCHLORIDE 1000 MG: 1 INJECTION, POWDER, LYOPHILIZED, FOR SOLUTION INTRAVENOUS at 10:10

## 2020-01-01 RX ADMIN — I.V. FAT EMULSION 250 ML: 20 EMULSION INTRAVENOUS at 08:10

## 2020-01-01 RX ADMIN — MORPHINE SULFATE 2 MG: 2 INJECTION, SOLUTION INTRAMUSCULAR; INTRAVENOUS at 01:10

## 2020-01-01 RX ADMIN — ALPRAZOLAM 0.25 MG: 0.25 TABLET ORAL at 09:10

## 2020-01-01 RX ADMIN — CEFEPIME 2 G: 2 INJECTION, POWDER, FOR SOLUTION INTRAVENOUS at 10:10

## 2020-01-01 RX ADMIN — ALTEPLASE 10 MG: 2.2 INJECTION, POWDER, LYOPHILIZED, FOR SOLUTION INTRAVENOUS at 09:10

## 2020-01-01 RX ADMIN — MORPHINE SULFATE 1 MG: 2 INJECTION, SOLUTION INTRAMUSCULAR; INTRAVENOUS at 04:10

## 2020-01-01 RX ADMIN — VANCOMYCIN HYDROCHLORIDE 1000 MG: 1 INJECTION, POWDER, LYOPHILIZED, FOR SOLUTION INTRAVENOUS at 08:10

## 2020-01-01 RX ADMIN — CALCIUM GLUCONATE: 98 INJECTION, SOLUTION INTRAVENOUS at 06:10

## 2020-01-01 RX ADMIN — LORAZEPAM 0.5 MG: 2 INJECTION INTRAMUSCULAR at 02:10

## 2020-01-01 RX ADMIN — Medication 1 MG/HR: at 06:10

## 2020-01-01 RX ADMIN — HYDROXYZINE HYDROCHLORIDE 10 MG: 10 TABLET, FILM COATED ORAL at 02:10

## 2020-01-01 RX ADMIN — LORAZEPAM 0.5 MG: 2 INJECTION INTRAMUSCULAR at 11:10

## 2020-01-01 RX ADMIN — THERA TABS 1 TABLET: TAB at 09:10

## 2020-01-01 RX ADMIN — LORAZEPAM 0.5 MG: 2 INJECTION INTRAMUSCULAR at 09:10

## 2020-01-01 RX ADMIN — ENOXAPARIN SODIUM 40 MG: 40 INJECTION SUBCUTANEOUS at 07:10

## 2020-01-01 RX ADMIN — OXYCODONE HYDROCHLORIDE AND ACETAMINOPHEN 500 MG: 500 TABLET ORAL at 10:10

## 2020-01-01 RX ADMIN — MORPHINE SULFATE 1 MG: 2 INJECTION, SOLUTION INTRAMUSCULAR; INTRAVENOUS at 06:10

## 2020-01-01 RX ADMIN — OXYCODONE HYDROCHLORIDE AND ACETAMINOPHEN 500 MG: 500 TABLET ORAL at 08:10

## 2020-01-01 RX ADMIN — FUROSEMIDE 40 MG: 10 INJECTION, SOLUTION INTRAMUSCULAR; INTRAVENOUS at 01:10

## 2020-01-01 RX ADMIN — LORAZEPAM 0.5 MG: 2 INJECTION INTRAMUSCULAR at 05:10

## 2020-01-01 RX ADMIN — CALCIUM GLUCONATE: 98 INJECTION, SOLUTION INTRAVENOUS at 01:10

## 2020-01-01 RX ADMIN — VANCOMYCIN HYDROCHLORIDE 1000 MG: 1 INJECTION, POWDER, LYOPHILIZED, FOR SOLUTION INTRAVENOUS at 12:10

## 2020-01-01 RX ADMIN — CALCIUM GLUCONATE 1000 MG: 94 INJECTION, SOLUTION INTRAVENOUS at 11:10

## 2020-01-01 RX ADMIN — FUROSEMIDE 40 MG: 10 INJECTION, SOLUTION INTRAMUSCULAR; INTRAVENOUS at 12:10

## 2020-01-01 RX ADMIN — CEFEPIME 2 G: 2 INJECTION, POWDER, FOR SOLUTION INTRAVENOUS at 08:10

## 2020-01-01 RX ADMIN — VANCOMYCIN HYDROCHLORIDE 1250 MG: 1.25 INJECTION, POWDER, LYOPHILIZED, FOR SOLUTION INTRAVENOUS at 02:10

## 2020-01-01 RX ADMIN — POTASSIUM PHOSPHATE, MONOBASIC AND POTASSIUM PHOSPHATE, DIBASIC 20 MMOL: 224; 236 INJECTION, SOLUTION, CONCENTRATE INTRAVENOUS at 09:10

## 2020-01-01 RX ADMIN — CALCIUM GLUCONATE 1000 MG: 98 INJECTION, SOLUTION INTRAVENOUS at 01:10

## 2020-10-08 PROBLEM — U07.1 COVID-19: Status: ACTIVE | Noted: 2020-01-01

## 2020-10-08 PROBLEM — J90 PLEURAL EFFUSION: Status: ACTIVE | Noted: 2020-01-01

## 2020-10-08 PROBLEM — J12.82 PNEUMONIA DUE TO COVID-19 VIRUS: Status: ACTIVE | Noted: 2020-01-01

## 2020-10-08 NOTE — ASSESSMENT & PLAN NOTE
79 y.o. male admitted for COVID pneumonia complicated by right parapneumonic pleural effusion s/p chest tube placement 9/29 and replacement on 10/4    Continue chest tube to waterseal. Monitor output  Daily CXR  Can consider lytic therapy if persistent effusion on imaging and minimal chest tube output  Will follow to assist with chest tube management

## 2020-10-08 NOTE — NURSING
Daughter Samantha called to inquire about mother's status. Pt spoke to daughter on phone via speaker phone.

## 2020-10-08 NOTE — ASSESSMENT & PLAN NOTE
Patient previously diagnosed with breast cancer 2 years prior.  Suggestive pleural effusions likely secondary to underlying malignancy.  Cytology studies have not resulted from outside facility.  Consult pulmonary and general surgery for management of pleural effusion and chest tube.

## 2020-10-08 NOTE — ASSESSMENT & PLAN NOTE
Suspected COVID-19 Virus Infection/ Viral Pneumonia due to COVID-19  - COVID-19 testing: pos at outside facility  - Isolation: Airborne/Droplet. Surgical mask on patient. Notify Infection Control  - Diagnostics: Trend Q48hrs if stable, more frequently if patient decompensating       - Management: Per [facility] COVID Treatment Protocol (4/15/20)    - Monitoring:   - Telemetry & Continuous Pulse Oximetry    - Nutrition:    - Multivitamin PO daily   - Add Boost supplement   - Vitamin D 1000IU daily if deficient   - Ascorbic acid 500mg PO bid    - Supportive Care:   - acetaminophen 650mg PO Q6hr PRN fever/headache   - loperamide PRN viral diarrhea   - IVF if indicated, restrictive strategy preferred, no maintenance IV if able   - VTE PPx: enoxaparin or heparin SQ unless contraindicated    - Antibiotics:  - if indications, CXR findings, elevated procal. See protocol for alternatives.   - Discontinue early if low concern for bacterial co-infection   - ceftriaxone 1g Q24h x 5 days      - Investigational Therapies:   - If patient meets criteria   - patient previously completed course of Remdesivir     Acute Hypoxemic Respiratory Failure  - Order RT consult via Respiratory Communication for COVID Protocols  - Order Incentive Spirometer Q4h  - Order Flutter Valve Q4h  - Continuous Pulse Oximetry  - Goal SpO2 92-96%  - Supplemental O2 via LFNC, VentiMask, or HFNC (see Respiratory Support Oxygen Therapies)  - If wheezing, albuterol INH Q6h scheduled & PRN  - Proning Protocol if patient is a candidate (see  Proning Protocol)   - GCS >13, able to self-prone  - If deterioration, may warrant trial of NIPPV and transfer to [facility] pressure room or immediate ICU consult

## 2020-10-08 NOTE — CARE UPDATE
Rapid Response Nurse Chart Check     Chart check completed, abnormal VS noted. Please call [phone] for further concerns or assistance.
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
## 2020-10-08 NOTE — SUBJECTIVE & OBJECTIVE
No current facility-administered medications on file prior to encounter.      No current outpatient medications on file prior to encounter.       Review of patient's allergies indicates:   Allergen Reactions    Penicillins     Sulfa (sulfonamide antibiotics)        No past medical history on file.  No past surgical history on file.  Family History     None        Tobacco Use    Smoking status: Not on file   Substance and Sexual Activity    Alcohol use: Not on file    Drug use: Not on file    Sexual activity: Not on file     Review of Systems   Constitutional: Positive for activity change. Negative for chills, fatigue and fever.   HENT: Negative.    Eyes: Negative.    Respiratory: Positive for cough and shortness of breath.    Cardiovascular: Negative.    Gastrointestinal: Negative.    Endocrine: Negative.    Genitourinary: Negative.    Musculoskeletal: Negative.    Neurological: Negative.    Hematological: Negative.    Psychiatric/Behavioral: Negative.      Objective:     Vital Signs (Most Recent):  Temp: 98.6 °F (37 °C) (10/08/20 1557)  Pulse: 95 (10/08/20 1416)  Resp: (!) 37 (10/08/20 1416)  BP: (!) 158/66 (10/08/20 1557)  SpO2: (!) 91 % (10/08/20 1416) Vital Signs (24h Range):  Temp:  [97.6 °F (36.4 °C)-98.6 °F (37 °C)] 98.6 °F (37 °C)  Pulse:  [72-95] 95  Resp:  [13-37] 37  SpO2:  [87 %-96 %] 91 %  BP: (137-165)/(57-72) 158/66     Weight: 60.8 kg (134 lb)  Body mass index is 23 kg/m².  ECOG Performance Status Grade: 1 - Ambulates, capable of light work    Intake/Output - Last 3 Shifts       10/06 0700 - 10/07 0659 10/07 0700 - 10/08 0659 10/08 0700 - 10/09 0659           Urine Occurrence   1 x    Stool Occurrence   1 x          SpO2: (!) 91 %  O2 Device (Oxygen Therapy): High Flow nasal Cannula(AIRVO)    Physical Exam  Vitals signs and nursing note reviewed.   Constitutional:       Appearance: Normal appearance.   HENT:      Head: Normocephalic.      Mouth/Throat:      Mouth: Mucous membranes are moist.    Eyes:      Extraocular Movements: Extraocular movements intact.      Pupils: Pupils are equal, round, and reactive to light.   Neck:      Musculoskeletal: Normal range of motion.   Cardiovascular:      Rate and Rhythm: Normal rate and regular rhythm.   Pulmonary:      Effort: No respiratory distress.      Breath sounds: No stridor.      Comments: Right chest tube in place with serous output  Abdominal:      General: Abdomen is flat.      Palpations: Abdomen is soft.   Musculoskeletal: Normal range of motion.   Skin:     General: Skin is warm.   Neurological:      General: No focal deficit present.      Mental Status: She is alert and oriented to person, place, and time.   Psychiatric:         Mood and Affect: Mood normal.         Behavior: Behavior normal.         Significant Labs:  ABGs: No results for input(s): PH, PCO2, PO2, HCO3, POCSATURATED, BE in the last 48 hours.  Amylase: No results for input(s): AMYLASE in the last 48 hours.  BMP:   Recent Labs   Lab 10/08/20  0725   GLU 85      K 4.0      CO2 22*   BUN 25*   CREATININE 0.6   CALCIUM 6.9*   MG 2.6     Cardiac markers:   Recent Labs   Lab 10/08/20  0725   TROPONINI <0.006     CBC:   Recent Labs   Lab 10/08/20  0725   WBC 61.43*   RBC 4.02   HGB 11.5*   HCT 36.1*      MCV 90   MCH 28.6   MCHC 31.9*     CMP:   Recent Labs   Lab 10/08/20  0725   GLU 85   CALCIUM 6.9*   ALBUMIN 2.0*   PROT 5.6*      K 4.0   CO2 22*      BUN 25*   CREATININE 0.6   ALKPHOS 225*   ALT 19   AST 29   BILITOT 0.7       Significant Diagnostics:  I have reviewed all pertinent imaging results/findings within the past 24 hours.    VTE Risk Mitigation (From admission, onward)         Ordered     enoxaparin injection 40 mg  Every 24 hours      10/08/20 0930     IP VTE HIGH RISK PATIENT  Once      10/08/20 0930     Place sequential compression device  Until discontinued      10/08/20 0627
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
## 2020-10-08 NOTE — NURSING
Pt in bed with HOB elevated, on  High flow O2.  No resp distress noted.  Chest tube intact.  No complaints of pain voiced.  Safety precautions maintained.  Will cont to monitor.

## 2020-10-08 NOTE — PLAN OF CARE
Pt in bed resting quietly.  No resp distress noted.  Plan of care discussed with daughter Lorraine.  Safety precautions maintained.  Will cont to monitor.

## 2020-10-08 NOTE — PROGRESS NOTES
Pharmacokinetic Initial Assessment: IV Vancomycin    Assessment/Plan:    -Initiate intravenous vancomycin with loading dose of 1250 mg once followed by a maintenance dose of vancomycin 1000mg IV every 24 hours  -Desired empiric serum trough concentration is 10 to 20 mcg/mL  -Draw vancomycin trough level 60 min prior to third dose on 10/10 at approximately 0900  -Pharmacy will continue to follow and monitor vancomycin.      Please contact pharmacy at extension [phone] with any questions regarding this assessment.     Thank you for the consult,   Luz Marina Mckeon, PharmD, BCPS       Patient brief summary:  Kami Barbosa is a 79 y.o. female initiated on antimicrobial therapy with IV Vancomycin for treatment of suspected lower respiratory infection    Drug Allergies:   Review of patient's allergies indicates:   Allergen Reactions    Penicillins     Sulfa (sulfonamide antibiotics)        Actual Body Weight:   60.8 kg    Renal Function:   CrCl cannot be calculated (Unknown ideal weight.)., Scr ~0.6 mg/dL    Dialysis Method (if applicable):  N/A    CBC (last 72 hours):  Recent Labs   Lab Result Units 10/08/20  0725   WBC K/uL 61.43*   Hemoglobin g/dL 11.5*   Hemoglobin A1C % 6.0*   Hematocrit % 36.1*   Platelets K/uL 253   Gran% % 91.0*   Lymph% % 3.0*   Mono% % 3.0*   Eosinophil% % 0.0   Basophil% % 0.0   Differential Method  Manual       Metabolic Panel (last 72 hours):  Recent Labs   Lab Result Units 10/08/20  0725   Sodium mmol/L 140   Potassium mmol/L 4.0   Chloride mmol/L 106   CO2 mmol/L 22*   Glucose mg/dL 85   BUN, Bld mg/dL 25*   Creatinine mg/dL 0.6   Albumin g/dL 2.0*   Total Bilirubin mg/dL 0.7   Alkaline Phosphatase U/L 225*   AST U/L 29   ALT U/L 19   Magnesium mg/dL 2.6   Phosphorus mg/dL 1.5*       Drug levels (last 3 results):  No results for input(s): VANCOMYCINRA, VANCOMYCINPE, VANCOMYCINTR in the last 72 hours.    Microbiologic Results:  Microbiology Results (last 7 days)     Procedure Component Value  Units Date/Time    Clostridium difficile EIA [[id number]]     Order Status: No result Specimen: Stool     Culture, Respiratory with Gram Stain [[id number]]     Order Status: No result Specimen: Respiratory from Sputum     Blood culture [[id number]] Collected: 10/08/20 0725    Order Status: Sent Specimen: Blood Updated: 10/08/20 0803    Blood culture [[id number]]     Order Status: Canceled Specimen: Blood
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
## 2020-10-08 NOTE — PLAN OF CARE
PCP: Dr. Yaima Sow           [facility] medicine            [address] [place] [address]            [phone]     Pharmacy:  [facility] ([facility])                      [address], [place] [address]                     [phone]

## 2020-10-09 PROBLEM — F41.9 ANXIETY: Status: ACTIVE | Noted: 2020-01-01

## 2020-10-09 PROBLEM — Z51.5 PALLIATIVE CARE ENCOUNTER: Status: ACTIVE | Noted: 2020-01-01
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
## 2020-10-09 NOTE — NURSING
Dr Perez (CTS) at bedside for instillation of Alteplase into R side chest tube.  MD readjusted tube & replaced suture prior to instilling Alteplase.  Per MD plan to return to bedside in 2 hours for instillation of Dornase.  Chest tube is currently clamped above and below connector.  Do not unclamp without MD instruction.  Notified GREGORIA Rivero RN

## 2020-10-09 NOTE — SUBJECTIVE & OBJECTIVE
Interval History: Patient was looking worse this AM. High O2 requirement and work of breathing. Seen by MICU who are now aware of her. Patient feeling anxious as well. Stated she would like to be DNI.    Review of Systems   Constitutional: Negative for fever.   HENT: Negative for congestion and rhinorrhea.    Eyes: Negative for visual disturbance.   Respiratory: Positive for shortness of breath.    Cardiovascular: Negative for chest pain.   Gastrointestinal: Negative for abdominal pain, nausea and vomiting.   Genitourinary: Negative for difficulty urinating.   Musculoskeletal: Negative for arthralgias.   Skin: Negative for color change.   Neurological: Negative for dizziness and light-headedness.   Psychiatric/Behavioral: The patient is nervous/anxious.      Objective:     Vital Signs (Most Recent):  Temp: 98.6 °F (37 °C) (10/09/20 1545)  Pulse: (!) 111 (10/09/20 1304)  Resp: (!) 55 (10/09/20 1304)  BP: (!) 150/67 (10/09/20 1251)  SpO2: 96 % (10/09/20 1304) Vital Signs (24h Range):  Temp:  [97.5 °F (36.4 °C)-98.7 °F (37.1 °C)] 98.6 °F (37 °C)  Pulse:  [] 111  Resp:  [24-55] 55  SpO2:  [88 %-97 %] 96 %  BP: (145-151)/(63-85) 150/67     Weight: 60.8 kg (134 lb)  Body mass index is 23 kg/m².    Intake/Output Summary (Last 24 hours) at 10/9/2020 1614  Last data filed at 10/9/2020 1544  Gross per 24 hour   Intake 240 ml   Output 1400 ml   Net -1160 ml      Physical Exam  Vitals signs and nursing note reviewed.   Constitutional:       General: She is in acute distress.      Appearance: She is ill-appearing.   HENT:      Head: Normocephalic and atraumatic.      Nose: Nose normal.   Eyes:      Extraocular Movements: Extraocular movements intact.      Conjunctiva/sclera: Conjunctivae normal.      Pupils: Pupils are equal, round, and reactive to light.   Neck:      Musculoskeletal: Normal range of motion.   Cardiovascular:      Rate and Rhythm: Normal rate and regular rhythm.      Heart sounds: Normal heart sounds. No  murmur. No gallop.    Pulmonary:      Effort: Respiratory distress present.      Breath sounds: No stridor. Rales (At the bases) present. No wheezing or rhonchi.   Abdominal:      General: Abdomen is flat. There is no distension.      Palpations: Abdomen is soft.      Tenderness: There is no abdominal tenderness. There is no guarding.   Musculoskeletal: Normal range of motion.   Skin:     General: Skin is warm.   Neurological:      General: No focal deficit present.      Mental Status: She is oriented to person, place, and time.         Significant Labs:   CBC:   Recent Labs   Lab 10/08/20  0725 10/09/20  0731   WBC 61.43* 59.07*   HGB 11.5* 11.1*   HCT 36.1* 35.8*    204     CMP:   Recent Labs   Lab 10/08/20  0725 10/09/20  0731    142   K 4.0 4.4    108   CO2 22* 20*   GLU 85 97   BUN 25* 23   CREATININE 0.6 0.6   CALCIUM 6.9* 6.9*   PROT 5.6* 5.4*   ALBUMIN 2.0* 1.9*   BILITOT 0.7 0.5   ALKPHOS 225* 274*   AST 29 34   ALT 19 19   ANIONGAP 12 14   EGFRNONAA >60.0 >60.0     All pertinent labs within the past 24 hours have been reviewed.    Significant Imaging: CXR: I have reviewed all pertinent results/findings within the past 24 hours and my personal findings are:  Worsening pneumonia/pulmonary edema compared to prior

## 2020-10-09 NOTE — CARE UPDATE
Rapid Response Nurse Chart Check     Chart check completed, abnormal VS noted. bedside RN, Demetrio contacted. No concerns verbalized at this time. Instructed to call [phone] for further concerns or assistance.

## 2020-10-09 NOTE — ASSESSMENT & PLAN NOTE
Patient states she would like to be DNI  -- Spoke to daughter and updated on medical condition   -- Palliative care consulted and met with patient and spoke to patient's daughter  -- Appreciate assistance  -- Patient's   a few years ago from lung cancer  -- Patient's daughter plans to come to [facility] to visit
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
## 2020-10-09 NOTE — ASSESSMENT & PLAN NOTE
Patient previously diagnosed with breast cancer 2 years prior.  Suggestive pleural effusions likely secondary to underlying malignancy.  Cytology studies have not resulted from outside facility.   -- Thoracic surgery consulted and following for management of chest tube, glenis delacruz

## 2020-10-09 NOTE — SUBJECTIVE & OBJECTIVE
Interval History: DNI/on Bi-PAP at 100 %    No past medical history on file.    No past surgical history on file.    Review of patient's allergies indicates:   Allergen Reactions    Penicillins     Sulfa (sulfonamide antibiotics)        Medications:  Continuous Infusions:  Scheduled Meds:   ascorbic acid (vitamin C)  500 mg Oral BID    ceFEPime (MAXIPIME) IVPB  2 g Intravenous Q8H    dornase madeleine (PULMOZYME) 5 mg in sterile water 30 mL  5 mg Chest Tube Once    enoxaparin  40 mg Subcutaneous Q24H    multivitamin  1 tablet Oral Daily    vancomycin (VANCOCIN) IVPB  15 mg/kg (Order-Specific) Intravenous Q24H     PRN Meds:albuterol-ipratropium, dextrose 50%, dextrose 50%, glucagon (human recombinant), glucose, glucose, lorazepam, sodium chloride 0.9%, Pharmacy to dose Vancomycin consult **AND** vancomycin - pharmacy to dose    Family History     None        Tobacco Use    Smoking status: Not on file   Substance and Sexual Activity    Alcohol use: Not on file    Drug use: Not on file    Sexual activity: Not on file       Review of Systems   Unable to perform ROS: Acuity of condition     Objective:     Vital Signs (Most Recent):  Temp: 97.5 °F (36.4 °C) (10/09/20 1251)  Pulse: (!) 111 (10/09/20 1304)  Resp: (!) 55 (10/09/20 1304)  BP: (!) 150/67 (10/09/20 1251)  SpO2: 96 % (10/09/20 1304) Vital Signs (24h Range):  Temp:  [97.5 °F (36.4 °C)-98.7 °F (37.1 °C)] 97.5 °F (36.4 °C)  Pulse:  [] 111  Resp:  [24-55] 55  SpO2:  [88 %-97 %] 96 %  BP: (145-158)/(63-85) 150/67     Weight: 60.8 kg (134 lb)  Body mass index is 23 kg/m².    Physical Exam  Constitutional:       General: She is sleeping.      Comments: Pt on Bi-Pap   HENT:      Head: Normocephalic.   Pulmonary:      Comments: On  BiPap  Musculoskeletal: Normal range of motion.   Neurological:      Comments: Pt sleeping. Opens eyes to touch but drifts back to sleep.          Review of Symptoms    Symptom Assessment (ESAS 0-10 Scale)  Pain:  0  Dyspnea:   0  Anxiety:  0  Nausea:  0  Depression:  0  Anorexia:  0  Fatigue:  0  Insomnia:  0  Restlessness:  0  Agitation:  0         Comments:  Unable to do ROS due to pt's mental status.           ECOG Performance Status Grade:  4 - Completely disabled    Psychosocial/Cultural: Pt's  is . He  of lung cancer. Pt has two daughters and one son.       Advance Care Planning   Advance Directives:   Living Will: No    LaPOST: No    Medical Power of : No    Agent's Name:  Pt's adult children are next of kin.     Decision Making:  Family answered questions         Significant Labs: All pertinent labs within the past 24 hours have been reviewed.  CBC:   Recent Labs   Lab 10/09/20  0731   WBC 59.07*   HGB 11.1*   HCT 35.8*   MCV 93        BMP:  Recent Labs   Lab 10/09/20  0731   GLU 97      K 4.4      CO2 20*   BUN 23   CREATININE 0.6   CALCIUM 6.9*   MG 2.4     LFT:  Lab Results   Component Value Date    AST 34 10/09/2020    ALKPHOS 274 (H) 10/09/2020    BILITOT 0.5 10/09/2020     Albumin:   Albumin   Date Value Ref Range Status   10/09/2020 1.9 (L) 3.5 - 5.2 g/dL Final     Protein:   Total Protein   Date Value Ref Range Status   10/09/2020 5.4 (L) 6.0 - 8.4 g/dL Final     Lactic acid:   Lab Results   Component Value Date    LACTATE 3.1 (H) 10/08/2020    LACTATE 3.2 (H) 10/08/2020       Significant Imaging: I have reviewed all pertinent imaging results/findings within the past 24 hours.

## 2020-10-09 NOTE — CARE UPDATE
Patient found with labored breathing pattern. Patient states ,(It hurts when I breathe). PRN  duoneb treatment given in-line with Airvo. Patient sats 89-90% and decreases with any movement. Patient will be placed on BIPAP. Will continue to monitor for any improvement or further decline in respiratory status.
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
## 2020-10-09 NOTE — PT/OT/SLP PROGRESS
Physical Therapy      Patient Name:  Kami Barbosa   MRN:  [id number]    Patient not seen today secondary to RN requesting hold PT evaluation this date. Patient recently placed on BiPAP and not stable for mobility. Will follow-up as scheduled.    Samantha Pena, PT   10/09/2020

## 2020-10-09 NOTE — SUBJECTIVE & OBJECTIVE
Interval History: chest tube to water seal. On 45L this morning.   No output from chest tube. Persistent effusion on CXR    Medications:  Continuous Infusions:  Scheduled Meds:   ascorbic acid (vitamin C)  500 mg Oral BID    ceFEPime (MAXIPIME) IVPB  2 g Intravenous Q12H    dexAMETHasone  6 mg Oral Daily    enoxaparin  40 mg Subcutaneous Q24H    multivitamin  1 tablet Oral Daily    vancomycin (VANCOCIN) IVPB  15 mg/kg (Order-Specific) Intravenous Q24H     PRN Meds:albuterol-ipratropium, dextrose 50%, dextrose 50%, glucagon (human recombinant), glucose, glucose, sodium chloride 0.9%, Pharmacy to dose Vancomycin consult **AND** vancomycin - pharmacy to dose     Review of patient's allergies indicates:   Allergen Reactions    Penicillins     Sulfa (sulfonamide antibiotics)      Objective:     Vital Signs (Most Recent):  Temp: 97.8 °F (36.6 °C) (10/09/20 0736)  Pulse: 100 (10/09/20 0400)  Resp: (!) 25 (10/09/20 0400)  BP: (!) 147/64 (10/09/20 0400)  SpO2: 97 % (10/09/20 0400) Vital Signs (24h Range):  Temp:  [97.8 °F (36.6 °C)-98.7 °F (37.1 °C)] 97.8 °F (36.6 °C)  Pulse:  [] 100  Resp:  [25-42] 25  SpO2:  [88 %-97 %] 97 %  BP: (145-158)/(63-85) 147/64     Intake/Output - Last 3 Shifts       10/07 0700 - 10/08 0659 10/08 0700 - 10/09 0659 10/09 0700 - 10/10 0659    P.O.  240     Total Intake(mL/kg)  240 (3.9)     Urine (mL/kg/hr)  500 (0.3)     Stool  0     Chest Tube  25     Total Output  525     Net  -285            Urine Occurrence  3 x     Stool Occurrence  2 x           SpO2: 97 %  O2 Device (Oxygen Therapy): High Flow nasal Cannula(airvo2 high flow)    Physical Exam  Vitals signs and nursing note reviewed.   Constitutional:       Appearance: Normal appearance.   HENT:      Head: Normocephalic.   Eyes:      Extraocular Movements: Extraocular movements intact.   Neck:      Musculoskeletal: Normal range of motion.   Cardiovascular:      Rate and Rhythm: Normal rate and regular rhythm.   Pulmonary:       Effort: No respiratory distress.      Breath sounds: No stridor.      Comments: Right chest tube in place with serous output  Abdominal:      General: Abdomen is flat.      Palpations: Abdomen is soft.   Skin:     General: Skin is warm.   Neurological:      Mental Status: She is alert.         Significant Labs:  BMP:   Recent Labs   Lab 10/08/20  0725   GLU 85      K 4.0      CO2 22*   BUN 25*   CREATININE 0.6   CALCIUM 6.9*   MG 2.6     CBC:   Recent Labs   Lab 10/08/20  0725   WBC 61.43*   RBC 4.02   HGB 11.5*   HCT 36.1*      MCV 90   MCH 28.6   MCHC 31.9*       Significant Diagnostics:  CXR: I have reviewed all pertinent results/findings within the past 24 hours    VTE Risk Mitigation (From admission, onward)         Ordered     enoxaparin injection 40 mg  Every 24 hours      10/08/20 0930     IP VTE HIGH RISK PATIENT  Once      10/08/20 0930     Place sequential compression device  Until discontinued      10/08/20 0627

## 2020-10-09 NOTE — HOSPITAL COURSE
With moderate respiratory difficulty but with increasing oxygen requirements. 10/9: 30L --> 45L 100% O2. Patient then switched to bipap with some improvement. Evaluated by critical care, who discussed with patient and family and decision was made to continue bipap and make patient DNI. Palliative care following patient and coordinated visitation by children to see patient. Patient remained on BiPAP, with minimal improvement and increasing FiO2 requirements. Due to patient's poor prognosis, discussions with family were held at which point decision was made to respect family & patient's wishes and transition patient to comfort care measures only. Patient remained on BiPAP per her family's request and kept comfortable with PRN medications and morphine gtt before peacefully passing on morning of 10/18/2020.

## 2020-10-09 NOTE — HPI
Pt is a 79-year-old female with past medical history of breast cancer who presented to [facility] on 09/26 with right shoulder pain and shortness of breath x 2 days. Per chart review, she was admitted for COVID pneumonia.  She has since completed a 10 day course of steroids, Remdesivir, and azithromycin.  She has required high-flow oxygen, at 30 L with 50% FiO2 to maintain adequate oxygenation. Hospital course was complicated by right pleural effusion requiring chest tube placement.  Cytology results are not available from pleural cultures.  After resolution of pleural effusion, chest tube was removed. However, shortly thereafter, the patient developed pneumothorax prompting placement of a new chest tube.  Medical history limited by lack of records from outside facility.  She was transferred to [facility] for further management due to evacuation protocol.  Upon my assessment, she appears chronically ill and does not appear in acute distress. On admit, she is conversant, AAO x3 and has no complaints at this time.  She was satting 95% on 40 L high-flow.  Right chest tube currently in place, site is clean, dry and intact.    Pt is a DNI

## 2020-10-09 NOTE — PT/OT/SLP PROGRESS
Occupational Therapy      Patient Name:  Kami Barbosa   MRN:  [id number]    Patient not seen today secondary to RN requesting hold OT evaluation this date. Patient recently placed on BiPAP and not stable for mobility. Will follow-up as scheduled.    NATALIIA Dahl  10/9/2020

## 2020-10-09 NOTE — ASSESSMENT & PLAN NOTE
Patient says she is anxious  -- Gave 0.25 xanax with no effect  -- Gave 0.5 IV ativan with improvement of symptoms  -- 0.5 IV ativan q6h PRN

## 2020-10-09 NOTE — PLAN OF CARE
Problem: Adult Inpatient Plan of Care  Goal: Plan of Care Review  Outcome: Ongoing, Progressing  Goal: Patient-Specific Goal (Individualization)  Outcome: Ongoing, Progressing  Goal: Absence of Hospital-Acquired Illness or Injury  Outcome: Ongoing, Progressing  Goal: Optimal Comfort and Wellbeing  Outcome: Ongoing, Progressing     Patient alert and oriented x3-4 but requires frequent redirection. Able to follow commands and move all extremities. Monitor shows patient in normal sinus rhythm and sinus tach on exertion. Patient had an episode of desaturation into low 60's from removing Comfort flow nasal cannula. Nasal cannula replaced and settings adjusted by RT for increased need for oxygenation. Denies chest pain and dizziness but reports shortness of breath. Afebrile. Vital signs stable on Comfort Flow. Chest tube to R chest site secure - dressing clean, dry and intact. Suction to water seal -20 mmHg. No drainage collected. Will continue to monitor

## 2020-10-09 NOTE — ASSESSMENT & PLAN NOTE
Suspected COVID-19 Virus Infection/ Viral Pneumonia due to COVID-19  - COVID-19 testing: pos at outside facility  - Isolation: Airborne/Droplet. Surgical mask on patient. Notify Infection Control  - Diagnostics: Trend Q48hrs if stable, more frequently if patient decompensating       - Management: Per [facility] COVID Treatment Protocol (4/15/20)    - Monitoring:   - Telemetry & Continuous Pulse Oximetry    - Nutrition:    - Multivitamin PO daily   - Add Boost supplement   - Vitamin D 1000IU daily if deficient   - Ascorbic acid 500mg PO bid    - Supportive Care:   - acetaminophen 650mg PO Q6hr PRN fever/headache   - loperamide PRN viral diarrhea   - IVF if indicated, restrictive strategy preferred, no maintenance IV if able   - VTE PPx: enoxaparin or heparin SQ unless contraindicated    - Antibiotics:  - if indications, CXR findings, elevated procal. See protocol for alternatives.   - Discontinue early if low concern for bacterial co-infection   - ceftriaxone 1g Q24h x 5 days      - Investigational Therapies:   - If patient meets criteria   - patient previously completed course of Remdesivir     Acute Hypoxemic Respiratory Failure  - Order RT consult via Respiratory Communication for COVID Protocols  - Order Incentive Spirometer Q4h  - Order Flutter Valve Q4h  - Continuous Pulse Oximetry  - Goal SpO2 92-96%  - Supplemental O2 via LFNC, VentiMask, or HFNC (see Respiratory Support Oxygen Therapies)  - If wheezing, albuterol INH Q6h scheduled & PRN  - Proning Protocol if patient is a candidate (see  Proning Protocol)   - GCS >13, able to self-prone  - If deterioration, may warrant trial of NIPPV and transfer to [facility] pressure room or immediate ICU consult  - Completed remdesivir and dexamethasone course    - 10/8 started vanc + cefipime for HAP coverage    - 10/9 Patient with increasing Oxygen requirements from 30L -> 45L 100%, switched to bipap with significant improvement  - MICU evaluated patient during this time and  agreed she is stable and are aware of her  - Patient is DNI, see palliative encounter for more details

## 2020-10-09 NOTE — CONSULTS
[facility] - ICU 15 WT  Palliative Medicine  Consult Note    Patient Name: Kami Barbosa  MRN: [id number]  Admission Date: 10/8/2020  Hospital Length of Stay: 1 days  Code Status: Partial Code   Attending Provider: Max Otero MD  Consulting Provider: SENDY Cedeño  Primary Care Physician: Primary Doctor No  Principal Problem:Pneumonia due to COVID-19 virus    Patient information was obtained from relative(s) and ER records.      Inpatient consult to Palliative Care  Consult performed by: SENDY Holguin  Consult ordered by: Chris Raya MD        Assessment/Plan:     Palliative care encounter  Impression: Pt is a 78 y/o female with past medical history of breast cancer who presented to [facility] on  with right shoulder pain and shortness of breath x 2 days. She was admitted for COVID pneumonia.  She has since completed a 10 day course of steroids, Remdesivir, and azithromycin. Pt is on Bi-PAP at 100% O2. Pt is sleeping. Not able to participate in conversation at this time. Pt is a DNI.     Reason for consult: Spoke to Dr. Raya. [facility] care cosulted for support/advance care-planning. Pt made DNI today per pt's request.     Rounded on pt. Pt is on Bi-PAP. Pt sleeping. Pt opened eyes to voice but drifts back to sleep. Unable to have a meaningful conversation.   Called and spoke to pt's daughter Lorraine over-the-phone. She is bracing for Hurricane in [facility]. Daughter is aware pt made DNI today. Per daughter, pt's   of lung cancer a few years ago and they had had the conversation about intubation. Per daughter, she was not surprised pt wanted to be a DNI. Per daughter, she respects her wishes. Updated daughter on new visiting policy. Per Lorraine, pt's daughter Samantha will most likely visit tomorrow due to being closer to [facility].     Pal care number given to daughter to call with any questions, concerns.     Support given.    Plan:    Pt is DNI.   Pt is on Bi-PAP continuing aggressive treatment.   Family is aware of pt's medical condition and code status.   Daughter, Samantha will most likely visit tomorrow.   Communicated with Dr. Raya.   Will follow.           Thank you for your consult. I will follow-up with patient. Please contact us if you have any additional questions.    Subjective:     HPI:   Pt is a 79-year-old female with past medical history of breast cancer who presented to [facility] on 09/26 with right shoulder pain and shortness of breath x 2 days. Per chart review, she was admitted for COVID pneumonia.  She has since completed a 10 day course of steroids, Remdesivir, and azithromycin.  She has required high-flow oxygen, at 30 L with 50% FiO2 to maintain adequate oxygenation. Hospital course was complicated by right pleural effusion requiring chest tube placement.  Cytology results are not available from pleural cultures.  After resolution of pleural effusion, chest tube was removed. However, shortly thereafter, the patient developed pneumothorax prompting placement of a new chest tube.  Medical history limited by lack of records from outside facility.  She was transferred to [facility] for further management due to evacuation protocol.  Upon my assessment, she appears chronically ill and does not appear in acute distress. On admit, she is conversant, AAO x3 and has no complaints at this time.  She was satting 95% on 40 L high-flow.  Right chest tube currently in place, site is clean, dry and intact.    Pt is a DNI    Hospital Course:  No notes on file    Interval History: DNI/on Bi-PAP at 100 %    No past medical history on file.    No past surgical history on file.    Review of patient's allergies indicates:   Allergen Reactions    Penicillins     Sulfa (sulfonamide antibiotics)        Medications:  Continuous Infusions:  Scheduled Meds:   ascorbic acid (vitamin C)  500 mg Oral BID     ceFEPime (MAXIPIME) IVPB  2 g Intravenous Q8H    dornase madeleine (PULMOZYME) 5 mg in sterile water 30 mL  5 mg Chest Tube Once    enoxaparin  40 mg Subcutaneous Q24H    multivitamin  1 tablet Oral Daily    vancomycin (VANCOCIN) IVPB  15 mg/kg (Order-Specific) Intravenous Q24H     PRN Meds:albuterol-ipratropium, dextrose 50%, dextrose 50%, glucagon (human recombinant), glucose, glucose, lorazepam, sodium chloride 0.9%, Pharmacy to dose Vancomycin consult **AND** vancomycin - pharmacy to dose    Family History     None        Tobacco Use    Smoking status: Not on file   Substance and Sexual Activity    Alcohol use: Not on file    Drug use: Not on file    Sexual activity: Not on file       Review of Systems   Unable to perform ROS: Acuity of condition     Objective:     Vital Signs (Most Recent):  Temp: 97.5 °F (36.4 °C) (10/09/20 1251)  Pulse: (!) 111 (10/09/20 1304)  Resp: (!) 55 (10/09/20 1304)  BP: (!) 150/67 (10/09/20 1251)  SpO2: 96 % (10/09/20 1304) Vital Signs (24h Range):  Temp:  [97.5 °F (36.4 °C)-98.7 °F (37.1 °C)] 97.5 °F (36.4 °C)  Pulse:  [] 111  Resp:  [24-55] 55  SpO2:  [88 %-97 %] 96 %  BP: (145-158)/(63-85) 150/67     Weight: 60.8 kg (134 lb)  Body mass index is 23 kg/m².    Physical Exam  Constitutional:       General: She is sleeping.      Comments: Pt on Bi-Pap   HENT:      Head: Normocephalic.   Pulmonary:      Comments: On  BiPap  Musculoskeletal: Normal range of motion.   Neurological:      Comments: Pt sleeping. Opens eyes to touch but drifts back to sleep.          Review of Symptoms    Symptom Assessment (ESAS 0-10 Scale)  Pain:  0  Dyspnea:  0  Anxiety:  0  Nausea:  0  Depression:  0  Anorexia:  0  Fatigue:  0  Insomnia:  0  Restlessness:  0  Agitation:  0         Comments:  Unable to do ROS due to pt's mental status.           ECOG Performance Status Grade:  4 - Completely disabled    Psychosocial/Cultural: Pt's  is . He  of lung cancer. Pt has two daughters  and one son.       Advance Care Planning   Advance Directives:   Living Will: No    LaPOST: No    Medical Power of : No    Agent's Name:  Pt's adult children are next of kin.     Decision Making:  Family answered questions         Significant Labs: All pertinent labs within the past 24 hours have been reviewed.  CBC:   Recent Labs   Lab 10/09/20  0731   WBC 59.07*   HGB 11.1*   HCT 35.8*   MCV 93        BMP:  Recent Labs   Lab 10/09/20  0731   GLU 97      K 4.4      CO2 20*   BUN 23   CREATININE 0.6   CALCIUM 6.9*   MG 2.4     LFT:  Lab Results   Component Value Date    AST 34 10/09/2020    ALKPHOS 274 (H) 10/09/2020    BILITOT 0.5 10/09/2020     Albumin:   Albumin   Date Value Ref Range Status   10/09/2020 1.9 (L) 3.5 - 5.2 g/dL Final     Protein:   Total Protein   Date Value Ref Range Status   10/09/2020 5.4 (L) 6.0 - 8.4 g/dL Final     Lactic acid:   Lab Results   Component Value Date    LACTATE 3.1 (H) 10/08/2020    LACTATE 3.2 (H) 10/08/2020       Significant Imaging: I have reviewed all pertinent imaging results/findings within the past 24 hours.      > 50% of 70 min visit spent in chart review, face to face discussion of goals of care,  symptom assessment, coordination of care and emotional support.    Gretel Pardo, CNS  Palliative Medicine  [facility] - ICU 15 WT

## 2020-10-09 NOTE — NURSING
Received pt in bed with HOB elevated.  CT intact.  No resp distress noted.  Safety precautions maintained.  Will cont to monitor.

## 2020-10-09 NOTE — PROGRESS NOTES
[facility] - ICU 15 [facility] Medicine  Progress Note    Patient Name: Kami Barbosa  MRN: [id number]  Patient Class: IP- Inpatient   Admission Date: 10/8/2020  Length of Stay: 1 days  Attending Physician: Max Otero MD  Primary Care Provider: Primary Doctor No        Subjective:     Principal Problem:Pneumonia due to COVID-19 virus      HPI:  Ms. Barbosa is a 79-year-old female with past medical history of breast cancer who presented to [facility] on 09/26 with right shoulder pain and shortness of breath x 2 days. She was admitted for COVID pneumonia.  She has since completed a 10 day course of steroids, Remdesivir, and azithromycin.  She has required high-flow oxygen, at 30 L with 50% FiO2 to maintain adequate oxygenation. Hospital course was complicated by right pleural effusion requiring chest tube placement.  Cytology results are not available from pleural cultures.  After resolution of pleural effusion, chest tube was removed. However, shortly thereafter, the patient developed pneumothorax prompting placement of a new chest tube.  Medical history limited by lack of records from outside facility.  She was transferred to [facility] for further management due to evacuation protocol.  Upon my assessment, she appears chronically ill and does not appear in acute distress.  She is conversant, aAAO x3 and has no complaints at this time.  She is satting 95% on 40 L high-flow.  Right chest tube currently in place, site is clean, dry and intact.           Overview/Hospital Course:  With moderate respiratory difficulty but with increasing oxygen requirements. 10/9: 30L --> 45L 100% O2. Patient then switched to bipap with some improvement. Seen my MICU who say she is stable for now but will accept if things get worse. Ativan PRN for anxiety.    Interval History: Patient was looking worse this AM. High O2 requirement and work of breathing. Seen by MICU who are now aware of  her. Patient feeling anxious as well. Stated she would like to be DNI.    Review of Systems   Constitutional: Negative for fever.   HENT: Negative for congestion and rhinorrhea.    Eyes: Negative for visual disturbance.   Respiratory: Positive for shortness of breath.    Cardiovascular: Negative for chest pain.   Gastrointestinal: Negative for abdominal pain, nausea and vomiting.   Genitourinary: Negative for difficulty urinating.   Musculoskeletal: Negative for arthralgias.   Skin: Negative for color change.   Neurological: Negative for dizziness and light-headedness.   Psychiatric/Behavioral: The patient is nervous/anxious.      Objective:     Vital Signs (Most Recent):  Temp: 98.6 °F (37 °C) (10/09/20 1545)  Pulse: (!) 111 (10/09/20 1304)  Resp: (!) 55 (10/09/20 1304)  BP: (!) 150/67 (10/09/20 1251)  SpO2: 96 % (10/09/20 1304) Vital Signs (24h Range):  Temp:  [97.5 °F (36.4 °C)-98.7 °F (37.1 °C)] 98.6 °F (37 °C)  Pulse:  [] 111  Resp:  [24-55] 55  SpO2:  [88 %-97 %] 96 %  BP: (145-151)/(63-85) 150/67     Weight: 60.8 kg (134 lb)  Body mass index is 23 kg/m².    Intake/Output Summary (Last 24 hours) at 10/9/2020 1614  Last data filed at 10/9/2020 1544  Gross per 24 hour   Intake 240 ml   Output 1400 ml   Net -1160 ml      Physical Exam  Vitals signs and nursing note reviewed.   Constitutional:       General: She is in acute distress.      Appearance: She is ill-appearing.   HENT:      Head: Normocephalic and atraumatic.      Nose: Nose normal.   Eyes:      Extraocular Movements: Extraocular movements intact.      Conjunctiva/sclera: Conjunctivae normal.      Pupils: Pupils are equal, round, and reactive to light.   Neck:      Musculoskeletal: Normal range of motion.   Cardiovascular:      Rate and Rhythm: Normal rate and regular rhythm.      Heart sounds: Normal heart sounds. No murmur. No gallop.    Pulmonary:      Effort: Respiratory distress present.      Breath sounds: No stridor. Rales (At the bases)  present. No wheezing or rhonchi.   Abdominal:      General: Abdomen is flat. There is no distension.      Palpations: Abdomen is soft.      Tenderness: There is no abdominal tenderness. There is no guarding.   Musculoskeletal: Normal range of motion.   Skin:     General: Skin is warm.   Neurological:      General: No focal deficit present.      Mental Status: She is oriented to person, place, and time.         Significant Labs:   CBC:   Recent Labs   Lab 10/08/20  0725 10/09/20  0731   WBC 61.43* 59.07*   HGB 11.5* 11.1*   HCT 36.1* 35.8*    204     CMP:   Recent Labs   Lab 10/08/20  0725 10/09/20  0731    142   K 4.0 4.4    108   CO2 22* 20*   GLU 85 97   BUN 25* 23   CREATININE 0.6 0.6   CALCIUM 6.9* 6.9*   PROT 5.6* 5.4*   ALBUMIN 2.0* 1.9*   BILITOT 0.7 0.5   ALKPHOS 225* 274*   AST 29 34   ALT 19 19   ANIONGAP 12 14   EGFRNONAA >60.0 >60.0     All pertinent labs within the past 24 hours have been reviewed.    Significant Imaging: CXR: I have reviewed all pertinent results/findings within the past 24 hours and my personal findings are:  Worsening pneumonia/pulmonary edema compared to prior      Assessment/Plan:      * Pneumonia due to COVID-19 virus  Suspected COVID-19 Virus Infection/ Viral Pneumonia due to COVID-19  - COVID-19 testing: pos at outside facility  - Isolation: Airborne/Droplet. Surgical mask on patient. Notify Infection Control  - Diagnostics: Trend Q48hrs if stable, more frequently if patient decompensating       - Management: Per [facility] COVID Treatment Protocol (4/15/20)    - Monitoring:   - Telemetry & Continuous Pulse Oximetry    - Nutrition:    - Multivitamin PO daily   - Add Boost supplement   - Vitamin D 1000IU daily if deficient   - Ascorbic acid 500mg PO bid    - Supportive Care:   - acetaminophen 650mg PO Q6hr PRN fever/headache   - loperamide PRN viral diarrhea   - IVF if indicated, restrictive strategy preferred, no maintenance IV if able   - VTE PPx: enoxaparin or  heparin SQ unless contraindicated    - Antibiotics:  - if indications, CXR findings, elevated procal. See protocol for alternatives.   - Discontinue early if low concern for bacterial co-infection   - ceftriaxone 1g Q24h x 5 days      - Investigational Therapies:   - If patient meets criteria   - patient previously completed course of Remdesivir     Acute Hypoxemic Respiratory Failure  - Order RT consult via Respiratory Communication for COVID Protocols  - Order Incentive Spirometer Q4h  - Order Flutter Valve Q4h  - Continuous Pulse Oximetry  - Goal SpO2 92-96%  - Supplemental O2 via LFNC, VentiMask, or HFNC (see Respiratory Support Oxygen Therapies)  - If wheezing, albuterol INH Q6h scheduled & PRN  - Proning Protocol if patient is a candidate (see HM Proning Protocol)   - GCS >13, able to self-prone  - If deterioration, may warrant trial of NIPPV and transfer to [facility] pressure room or immediate ICU consult  - Completed remdesivir and dexamethasone course    - 10/8 started vanc + cefipime for HAP coverage    - 10/9 Patient with increasing Oxygen requirements from 30L -> 45L 100%, switched to bipap with significant improvement  - MICU evaluated patient during this time and agreed she is stable and are aware of her  - Patient is DNI, see palliative encounter for more details        Anxiety  Patient says she is anxious  -- Gave 0.25 xanax with no effect  -- Gave 0.5 IV ativan with improvement of symptoms  -- 0.5 IV ativan q6h PRN      Advance care planning  See palliative care encounter    Palliative care encounter  Patient states she would like to be DNI  -- Spoke to daughter and updated on medical condition   -- Palliative care consulted and met with patient and spoke to patient's daughter  -- Appreciate assistance  -- Patient's   a few years ago from lung cancer  -- Patient's daughter plans to come to [facility] to visit    Pleural effusion  Patient previously diagnosed with breast cancer 2 years prior.   Suggestive pleural effusions likely secondary to underlying malignancy.  Cytology studies have not resulted from outside facility.   -- Thoracic surgery consulted and following for management of chest tube, appreciate recs      VTE Risk Mitigation (From admission, onward)         Ordered     enoxaparin injection 40 mg  Every 24 hours      10/08/20 0930     IP VTE HIGH RISK PATIENT  Once      10/08/20 0930     Place sequential compression device  Until discontinued      10/08/20 0627                Discharge Planning   CHRIS: 10/13/2020     Code Status: Partial Code   Is the patient medically ready for discharge?: No    Reason for patient still in hospital (select all that apply): Patient trending condition  Discharge Plan A: Home   Discharge Delays: None known at this time          Chris Raya MD  Department of Hospital Medicine   [facility] - ICU 15 [facility]

## 2020-10-09 NOTE — ASSESSMENT & PLAN NOTE
79 y.o. male admitted for COVID pneumonia complicated by right parapneumonic pleural effusion s/p chest tube placement 9/29 and replacement on 10/4    Will try lytics through chest tube today   Daily CXR  Will follow to assist with chest tube management

## 2020-10-10 NOTE — NURSING
Physician in to discuss patient's condition and plan of care with family member present (eldest daughter).

## 2020-10-10 NOTE — PROGRESS NOTES
[facility] - ICU 15 [facility] Medicine  Progress Note    Patient Name: Kami Barbosa  MRN: [id number]  Patient Class: IP- Inpatient   Admission Date: 10/8/2020  Length of Stay: 2 days  Attending Physician: Max Otero MD  Primary Care Provider: Primary Doctor No        Subjective:     Principal Problem:Pneumonia due to COVID-19 virus        HPI:  Ms. Barbosa is a 79-year-old female with past medical history of breast cancer who presented to [facility] on 09/26 with right shoulder pain and shortness of breath x 2 days. She was admitted for COVID pneumonia.  She has since completed a 10 day course of steroids, Remdesivir, and azithromycin.  She has required high-flow oxygen, at 30 L with 50% FiO2 to maintain adequate oxygenation. Hospital course was complicated by right pleural effusion requiring chest tube placement.  Cytology results are not available from pleural cultures.  After resolution of pleural effusion, chest tube was removed. However, shortly thereafter, the patient developed pneumothorax prompting placement of a new chest tube.  Medical history limited by lack of records from outside facility.  She was transferred to [facility] for further management due to evacuation protocol.  Upon my assessment, she appears chronically ill and does not appear in acute distress.  She is conversant, aAAO x3 and has no complaints at this time.  She is satting 95% on 40 L high-flow.  Right chest tube currently in place, site is clean, dry and intact.             Overview/Hospital Course:  With moderate respiratory difficulty but with increasing oxygen requirements. 10/9: 30L --> 45L 100% O2. Patient then switched to bipap with some improvement. Evaluated by critical care, who discussed with patient and family and decision was made to continue bipap and make patient DNI. Palliative care consulted.     Interval History: Patient continues on bipap this am. Family  visiting.    Review of Systems   Constitutional: Negative for fever.   HENT: Negative for congestion and rhinorrhea.    Eyes: Negative for visual disturbance.   Respiratory: Positive for shortness of breath.    Cardiovascular: Negative for chest pain.   Gastrointestinal: Negative for abdominal pain, nausea and vomiting.   Genitourinary: Negative for difficulty urinating.   Musculoskeletal: Negative for arthralgias.   Skin: Negative for color change.   Neurological: Negative for dizziness and light-headedness.   Psychiatric/Behavioral: The patient is nervous/anxious.      Objective:     Vital Signs (Most Recent):  Temp: 98.1 °F (36.7 °C) (10/10/20 0737)  Pulse: 104 (10/10/20 1135)  Resp: (!) 42 (10/10/20 0737)  BP: 135/61 (10/10/20 0737)  SpO2: 96 % (10/10/20 1145) Vital Signs (24h Range):  Temp:  [98.1 °F (36.7 °C)-98.6 °F (37 °C)] 98.1 °F (36.7 °C)  Pulse:  [] 104  Resp:  [26-42] 42  SpO2:  [92 %-99 %] 96 %  BP: (129-153)/(59-70) 135/61     Weight: 60.8 kg (134 lb)  Body mass index is 23 kg/m².    Intake/Output Summary (Last 24 hours) at 10/10/2020 1515  Last data filed at 10/10/2020 0558  Gross per 24 hour   Intake 0 ml   Output 1415 ml   Net -1415 ml      Physical Exam  Vitals signs and nursing note reviewed.   Constitutional:       General: She is in acute distress.      Appearance: She is ill-appearing.   HENT:      Head: Normocephalic and atraumatic.      Nose: Nose normal.   Eyes:      Extraocular Movements: Extraocular movements intact.      Conjunctiva/sclera: Conjunctivae normal.      Pupils: Pupils are equal, round, and reactive to light.   Neck:      Musculoskeletal: Normal range of motion.   Cardiovascular:      Rate and Rhythm: Normal rate and regular rhythm.      Heart sounds: Normal heart sounds. No murmur. No gallop.    Pulmonary:      Effort: Respiratory distress present.      Breath sounds: No stridor. Rales (At the bases) present. No wheezing or rhonchi.   Abdominal:      General: Abdomen  is flat. There is no distension.      Palpations: Abdomen is soft.      Tenderness: There is no abdominal tenderness. There is no guarding.   Musculoskeletal: Normal range of motion.   Skin:     General: Skin is warm.   Neurological:      General: No focal deficit present.      Mental Status: She is oriented to person, place, and time.           Assessment/Plan:      * Pneumonia due to COVID-19 virus  Suspected COVID-19 Virus Infection/ Viral Pneumonia due to COVID-19  - COVID-19 testing: pos at outside facility  - Isolation: Airborne/Droplet. Surgical mask on patient. Notify Infection Control  - Diagnostics: Trend Q48hrs if stable, more frequently if patient decompensating       - Management: Per [facility] COVID Treatment Protocol (4/15/20)    - Monitoring:   - Telemetry & Continuous Pulse Oximetry    - Nutrition:    - Multivitamin PO daily   - Add Boost supplement   - Vitamin D 1000IU daily if deficient   - Ascorbic acid 500mg PO bid    - Supportive Care:   - acetaminophen 650mg PO Q6hr PRN fever/headache   - loperamide PRN viral diarrhea   - IVF if indicated, restrictive strategy preferred, no maintenance IV if able   - VTE PPx: enoxaparin or heparin SQ unless contraindicated    - Antibiotics:  - if indications, CXR findings, elevated procal. See protocol for alternatives.   - Discontinue early if low concern for bacterial co-infection   - ceftriaxone 1g Q24h x 5 days      - Investigational Therapies:   - If patient meets criteria   - patient previously completed course of Remdesivir     Acute Hypoxemic Respiratory Failure  - Order RT consult via Respiratory Communication for COVID Protocols  - Order Incentive Spirometer Q4h  - Order Flutter Valve Q4h  - Continuous Pulse Oximetry  - Goal SpO2 92-96%  - Supplemental O2 via LFNC, VentiMask, or HFNC (see Respiratory Support Oxygen Therapies)  - If wheezing, albuterol INH Q6h scheduled & PRN  - Proning Protocol if patient is a candidate (see  Proning Protocol)   -  GCS >13, able to self-prone  - If deterioration, may warrant trial of NIPPV and transfer to neg pressure room or immediate ICU consult  - Completed remdesivir and dexamethasone course  - 10/8 started vanc + cefipime for HAP coverage    - 10/9 Patient with increasing Oxygen requirements from 30L -> 45L 100%, switched to bipap with significant improvement. Continue diuresis  - MICU evaluated patient during this time, GOC and patient now DNI, palliative care consulted.        Anxiety  Patient with significant anxiety  -- Gave 0.25 xanax with no effect  -- Gave 0.5 IV ativan with improvement of symptoms  -- Continue ativan prn for anxiety. Caution with use of benzo. Trial low dose morphine for dyspnea as well      Advance care planning  See palliative care encounter    Palliative care encounter  Patient states she would like to be DNI  -- Spoke to daughter and updated on medical condition   -- Palliative care consulted and met with patient and spoke to patient's daughter  -- Appreciate assistance  -- Patient's   a few years ago from lung cancer  -- Patient's daughter plans to come to [facility] to visit    Pleural effusion  Patient previously diagnosed with breast cancer 2 years prior.  Suggestive pleural effusions likely secondary to underlying malignancy.  Cytology studies have not resulted from outside facility.   -- Thoracic surgery consulted and following for management of chest tube, appreciate recs      VTE Risk Mitigation (From admission, onward)         Ordered     enoxaparin injection 40 mg  Every 24 hours      10/08/20 0930     IP VTE HIGH RISK PATIENT  Once      10/08/20 0930     Place sequential compression device  Until discontinued      10/08/20 0627                Discharge Planning   CHRIS: 10/13/2020     Code Status: Partial Code   Is the patient medically ready for discharge?: No    Reason for patient still in hospital (select all that apply): Patient unstable, Patient trending condition and  Treatment  Discharge Plan A: Home   Discharge Delays: None known at this time              Sven Contreras MD  Department of Hospital Medicine   [facility] - ICU 15 [facility]

## 2020-10-10 NOTE — PT/OT/SLP PROGRESS
Physical Therapy      Patient Name:  Kami Barbosa   MRN:  [id number]    PT orders received and acknowledged. Pt not evaluated today 2/2 pt on BiPAP with decreased cognitive status and tenuous respiratory status with DNI in place. Pt not appropriate to mobilize at this time. Will follow-up as pt is medically appropriate.    Veronique Branch, PT

## 2020-10-10 NOTE — NURSING
Patient resting quietly in bed, eyes closed. resp less rapid, O2 sats improve to 98% on BIPAP. Heart rate improved to lower 100s, resp rate lower to 20s. Appears in no acute distress. Continue to monitor.

## 2020-10-10 NOTE — PLAN OF CARE
Problem: Adult Inpatient Plan of Care  Goal: Plan of Care Review  Outcome: Ongoing, Progressing     Problem: Infection  Goal: Infection Symptom Resolution  Outcome: Ongoing, Progressing   Patient without any acute events overnight. Patient did not require any PRN ativan and was easily redirected. This RN explained the importance of wearing BIPAP continuously and encouraged continued compliance. Patient requesting to remove mask, but O2 sats decreases to 60s without BIPAP. While on BIPAP AT 80% FiO2, O2 sats are 97-99%. Patient has a right lateral chest tube that had a total of 200cc of output throughout noc shift. Oncoming nurse to claim all drainage about 340cc. Drainage is serosanguinous without any signs of bright red blood. CT dsg has vaseline gauze, split gauze, gauze, medipore tape, dsg is CDI. Plan of care discussed with patient and patient verbalizes understanding at this time. Patient is awake, alert and orientedx3 with disorientation to time only. VSS, afebrile. Patient resting in bed, call bell in reach, safety measures in place. Will continue to monitor.

## 2020-10-10 NOTE — CARE UPDATE
Rapid Response Nurse Chart Check     Chart check completed, abnormal VS and lab values noted. Please call [phone] for further concerns or assistance.

## 2020-10-10 NOTE — CARE UPDATE
"RAPID RESPONSE NURSE PROACTIVE ROUNDING NOTE     Time of Visit: 0949    Admit Date: 10/8/2020  LOS: 2  Code Status: Partial Code   Date of Visit: 10/10/2020  : 1940  Age: 79 y.o.  Sex: female  Race: White  Bed: 70397/44202 A:   MRN: [id number]  Was the patient discharged from an ICU this admission? no   Was the patient discharged from a PACU within last 24 hours?  no  Did the patient receive conscious sedation/general anesthesia in last 24 hours?  no  Was the patient in the ED within the past 24 hours?  no  Was the patient started on NIPPV within the past 24 hours?  yes  Attending Physician: Max Otero MD  Primary Service: [facility] MED 1    ASSESSMENT     Notified by 15 WT Proactive Round .  Reason for alert: Proactive     Diagnosis: Pneumonia due to COVID-19 virus    Abnormal Vital Signs: /61 (BP Location: Left arm, Patient Position: Lying)   Pulse 104   Temp 98.1 °F (36.7 °C) (Axillary)   Resp (!) 42   Ht 5' 4" (1.626 m)   Wt 60.8 kg (134 lb)   SpO2 96%   BMI 23.00 kg/m²      Clinical Issues: Respiratory    Patient  has no past medical history on file.      Patient is found supine with decreased LOC. Current code status remains DNI despite near maximum NIPPV. Palliative care is currently following. Family member present during round with primary RN. Will continue to assist with teams plan of care.      INTERVENTIONS/ RECOMMENDATIONS     Continue to monitor for acute change in status. Follow current orders.     Discussed plan of care with RNUbaldo.    PHYSICIAN ESCALATION     Yes/No  no    Orders received and case discussed with NA.    Disposition: Remain in room 20772.    FOLLOW-UP     Call back the Rapid Response Nurse, Irineo Lauren RN at [phone] for additional questions or concerns.          "

## 2020-10-10 NOTE — NURSING
Adm Morphine 1 mg IVP per patient moaning and writhing in bed after turning and repositioning. VS stable.

## 2020-10-10 NOTE — NURSING
Daughter continues at bedside. Patient awake at times, continues with agitation at times but quickly resumes resting. VS stable at present.

## 2020-10-10 NOTE — PROGRESS NOTES
Pharmacokinetic Assessment Follow Up: IV Vancomycin    Vancomycin serum concentration assessment(s):    -Vancomycin level was drawn ~ 24 hrs from the previous dose and can be used to guide therapy.  -Level of 4.5 mcg/mL is below therapeutic range 15-20 mcg/mL for pneumonia.   -ke =0.052 hr-1   -T 1/2 = 13.4 hrs     Vancomycin Regimen Plan:    -Change regimen to vancomycin 1000 mg IV Q12H (16 mg/kg).  -Monitor for changes in renal function closely.   -Obtain a trough prior to the fourth dose on 10/11 @ 2130.     Drug levels (last 3 results):  Recent Labs   Lab Result Units 10/10/20  0956   Vancomycin-Trough ug/mL 4.5*       Pharmacy will continue to follow and monitor vancomycin.    Please contact pharmacy at extension [phone] for questions regarding this assessment.    Thank you for the consult,   Demetrio Montalvo       Patient brief summary:  Kami Barbosa is a 79 y.o. female initiated on antimicrobial therapy with IV Vancomycin for treatment of lower respiratory infection    Drug Allergies:   Review of patient's allergies indicates:   Allergen Reactions    Penicillins     Sulfa (sulfonamide antibiotics)        Actual Body Weight:   60.8 kg    Renal Function:   Estimated Creatinine Clearance: 57.2 mL/min (based on SCr of 0.7 mg/dL).,     Dialysis Method (if applicable):  N/A    CBC (last 72 hours):  Recent Labs   Lab Result Units 10/08/20  0725 10/09/20  0731 10/10/20  0503   WBC K/uL 61.43* 59.07* 79.21*   Hemoglobin g/dL 11.5* 11.1* 12.1   Hemoglobin A1C % 6.0*  --   --    Hematocrit % 36.1* 35.8* 39.4   Platelets K/uL 253 204 223   Gran% % 91.0* 96.0* 98.0*   Lymph% % 3.0* 1.0* 1.0*   Mono% % 3.0* 2.5* 1.0*   Eosinophil% % 0.0 0.0 0.0   Basophil% % 0.0 0.0 0.0   Differential Method  Manual Manual Automated       Metabolic Panel (last 72 hours):  Recent Labs   Lab Result Units 10/08/20  0725 10/09/20  0731 10/10/20  0503   Sodium mmol/L 140 142 144   Potassium mmol/L 4.0 4.4 4.2   Chloride mmol/L 106 108 106   CO2  mmol/L 22* 20* 21*   Glucose mg/dL 85 97 87   BUN, Bld mg/dL 25* 23 27*   Creatinine mg/dL 0.6 0.6 0.7   Albumin g/dL 2.0* 1.9* 2.1*   Total Bilirubin mg/dL 0.7 0.5 0.8   Alkaline Phosphatase U/L 225* 274* 277*   AST U/L 29 34 36   ALT U/L 19 19 19   Magnesium mg/dL 2.6 2.4 2.8*   Phosphorus mg/dL 1.5* 1.6* 2.1*       Vancomycin Administrations:  vancomycin given in the last 96 hours                   vancomycin in dextrose 5 % 1 gram/250 mL IVPB 1,000 mg (mg) 1,000 mg New Bag 10/10/20 1038     1,000 mg New Bag 10/09/20 0943    vancomycin 1.25 g in dextrose 5% 250 mL IVPB (ready to mix) (mg) 1,250 mg New Bag 10/08/20 1402                Microbiologic Results:  Microbiology Results (last 7 days)     Procedure Component Value Units Date/Time    Blood culture [[id number]] Collected: 10/08/20 0725    Order Status: Completed Specimen: Blood Updated: 10/10/20 1012     Blood Culture, Routine No Growth to date      No Growth to date      No Growth to date    Clostridium difficile EIA [[id number]]     Order Status: Canceled Specimen: Stool     Culture, Respiratory with Gram Stain [[id number]]     Order Status: No result Specimen: Respiratory from Sputum     Blood culture [[id number]]     Order Status: Canceled Specimen: Blood

## 2020-10-10 NOTE — NURSING
Critical value of WBC 79.21. Notified Med Team 1 and no new orders at this time. Will notify oncoming nurse of patient's condition. Will continue to monitor.

## 2020-10-10 NOTE — NURSING
Patient with visitor at bedside. Patient quiet, appears with occasional moans and mumblings but otherwise asleep. VS stable.

## 2020-10-10 NOTE — SUBJECTIVE & OBJECTIVE
Interval History: Patient continues on bipap this am. Family visiting.    Review of Systems   Constitutional: Negative for fever.   HENT: Negative for congestion and rhinorrhea.    Eyes: Negative for visual disturbance.   Respiratory: Positive for shortness of breath.    Cardiovascular: Negative for chest pain.   Gastrointestinal: Negative for abdominal pain, nausea and vomiting.   Genitourinary: Negative for difficulty urinating.   Musculoskeletal: Negative for arthralgias.   Skin: Negative for color change.   Neurological: Negative for dizziness and light-headedness.   Psychiatric/Behavioral: The patient is nervous/anxious.      Objective:     Vital Signs (Most Recent):  Temp: 98.1 °F (36.7 °C) (10/10/20 0737)  Pulse: 104 (10/10/20 1135)  Resp: (!) 42 (10/10/20 0737)  BP: 135/61 (10/10/20 0737)  SpO2: 96 % (10/10/20 1145) Vital Signs (24h Range):  Temp:  [98.1 °F (36.7 °C)-98.6 °F (37 °C)] 98.1 °F (36.7 °C)  Pulse:  [] 104  Resp:  [26-42] 42  SpO2:  [92 %-99 %] 96 %  BP: (129-153)/(59-70) 135/61     Weight: 60.8 kg (134 lb)  Body mass index is 23 kg/m².    Intake/Output Summary (Last 24 hours) at 10/10/2020 1515  Last data filed at 10/10/2020 0558  Gross per 24 hour   Intake 0 ml   Output 1415 ml   Net -1415 ml      Physical Exam  Vitals signs and nursing note reviewed.   Constitutional:       General: She is in acute distress.      Appearance: She is ill-appearing.   HENT:      Head: Normocephalic and atraumatic.      Nose: Nose normal.   Eyes:      Extraocular Movements: Extraocular movements intact.      Conjunctiva/sclera: Conjunctivae normal.      Pupils: Pupils are equal, round, and reactive to light.   Neck:      Musculoskeletal: Normal range of motion.   Cardiovascular:      Rate and Rhythm: Normal rate and regular rhythm.      Heart sounds: Normal heart sounds. No murmur. No gallop.    Pulmonary:      Effort: Respiratory distress present.      Breath sounds: No stridor. Rales (At the bases) present.  No wheezing or rhonchi.   Abdominal:      General: Abdomen is flat. There is no distension.      Palpations: Abdomen is soft.      Tenderness: There is no abdominal tenderness. There is no guarding.   Musculoskeletal: Normal range of motion.   Skin:     General: Skin is warm.   Neurological:      General: No focal deficit present.      Mental Status: She is oriented to person, place, and time.
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
## 2020-10-10 NOTE — SUBJECTIVE & OBJECTIVE
Interval History: Increasing O2 requirement and worsening mental status since yesterday   350cc chest tube output   CXR looks improved this AM     Medications:  Continuous Infusions:  Scheduled Meds:   ascorbic acid (vitamin C)  500 mg Oral BID    ceFEPime (MAXIPIME) IVPB  2 g Intravenous Q8H    dornase madeleine (PULMOZYME) 5 mg in sterile water 30 mL  5 mg Chest Tube Once    enoxaparin  40 mg Subcutaneous Q24H    multivitamin  1 tablet Oral Daily    vancomycin (VANCOCIN) IVPB  15 mg/kg (Order-Specific) Intravenous Q24H     PRN Meds:albuterol-ipratropium, dextrose 50%, dextrose 50%, glucagon (human recombinant), glucose, glucose, lorazepam, sodium chloride 0.9%, Pharmacy to dose Vancomycin consult **AND** vancomycin - pharmacy to dose     Review of patient's allergies indicates:   Allergen Reactions    Penicillins     Sulfa (sulfonamide antibiotics)      Objective:     Vital Signs (Most Recent):  Temp: 98.1 °F (36.7 °C) (10/10/20 0737)  Pulse: 108 (10/10/20 0737)  Resp: (!) 42 (10/10/20 0737)  BP: 135/61 (10/10/20 0737)  SpO2: 95 %(Simultaneous filing. User may not have seen previous data.) (10/10/20 0737) Vital Signs (24h Range):  Temp:  [97.5 °F (36.4 °C)-98.6 °F (37 °C)] 98.1 °F (36.7 °C)  Pulse:  [] 108  Resp:  [26-55] 42  SpO2:  [92 %-99 %] 95 %  BP: (129-153)/(59-70) 135/61     Intake/Output - Last 3 Shifts       10/08 0700 - 10/09 0659 10/09 0700 - 10/10 0659 10/10 0700 - 10/11 0659    P.O. 240 120     Total Intake(mL/kg) 240 (3.9) 120 (2)     Urine (mL/kg/hr) 500 (0.3) 1075 (0.7)     Stool 0      Chest Tube 25 340     Total Output 525 1415     Net -285 -1295            Urine Occurrence 3 x      Stool Occurrence 2 x  1 x          SpO2: 95 %(Simultaneous filing. User may not have seen previous data.)  O2 Device (Oxygen Therapy): BiPAP    Physical Exam  Vitals signs and nursing note reviewed.   Constitutional:       Appearance: Normal appearance.   HENT:      Head: Normocephalic.   Eyes:       Extraocular Movements: Extraocular movements intact.   Neck:      Musculoskeletal: Normal range of motion.   Cardiovascular:      Rate and Rhythm: Normal rate and regular rhythm.   Pulmonary:      Effort: No respiratory distress.      Breath sounds: No stridor.      Comments: Right chest tube in place with dark SSoutput  Abdominal:      General: Abdomen is flat.      Palpations: Abdomen is soft.   Skin:     General: Skin is warm.   Neurological:      Mental Status: She is alert.         Significant Labs:  ABGs:   Recent Labs   Lab 10/10/20  0255   PH 7.506*   PCO2 27.1*   PO2 70*   HCO3 21.4*   POCSATURATED 96   BE -2     Amylase: No results for input(s): AMYLASE in the last 48 hours.  BMP:   Recent Labs   Lab 10/10/20  0503   GLU 87      K 4.2      CO2 21*   BUN 27*   CREATININE 0.7   CALCIUM 7.4*   MG 2.8*     Cardiac markers: No results for input(s): CKMB, CPKMB, TROPONINT, TROPONINI, MYOGLOBIN in the last 48 hours.  CBC:   Recent Labs   Lab 10/10/20  0503   WBC 79.21*   RBC 4.33   HGB 12.1   HCT 39.4      MCV 91   MCH 27.9   MCHC 30.7*     CMP:   Recent Labs   Lab 10/10/20  0503   GLU 87   CALCIUM 7.4*   ALBUMIN 2.1*   PROT 6.3      K 4.2   CO2 21*      BUN 27*   CREATININE 0.7   ALKPHOS 277*   ALT 19   AST 36   BILITOT 0.8       Significant Diagnostics:  I have reviewed all pertinent imaging results/findings within the past 24 hours.    VTE Risk Mitigation (From admission, onward)         Ordered     enoxaparin injection 40 mg  Every 24 hours      10/08/20 0930     IP VTE HIGH RISK PATIENT  Once      10/08/20 0930     Place sequential compression device  Until discontinued      10/08/20 0627

## 2020-10-10 NOTE — NURSING
Adm Ativan 0.5 ng IVP per extreme anxiety AEB pulling off mask, elevated heart rate and elevated resp rate, with BIPAP.

## 2020-10-10 NOTE — ASSESSMENT & PLAN NOTE
79 y.o. male admitted for COVID pneumonia complicated by right parapneumonic pleural effusion s/p chest tube placement 9/29 and replacement on 10/4    S/p lytics 10/9   Keep chest tube to suction today   Rest of care per primary team. Guarded prognosis

## 2020-10-11 PROBLEM — D72.829 LEUKOCYTOSIS: Status: ACTIVE | Noted: 2020-01-01

## 2020-10-11 PROBLEM — J95.811 POSTPROCEDURAL PNEUMOTHORAX: Status: ACTIVE | Noted: 2020-01-01

## 2020-10-11 NOTE — ASSESSMENT & PLAN NOTE
Suspected COVID-19 Virus Infection/ Viral Pneumonia due to COVID-19  - COVID-19 testing: pos at outside facility  - Isolation: Airborne/Droplet. Surgical mask on patient. Notify Infection Control  - Diagnostics: Trend Q48hrs if stable, more frequently if patient decompensating       - Management: Per [facility] COVID Treatment Protocol (4/15/20)    - Monitoring:   - Telemetry & Continuous Pulse Oximetry    - Nutrition:    - Multivitamin PO daily   - Add Boost supplement   - Vitamin D 1000IU daily if deficient   - Ascorbic acid 500mg PO bid    - Supportive Care:   - acetaminophen 650mg PO Q6hr PRN fever/headache   - loperamide PRN viral diarrhea   - IVF if indicated, restrictive strategy preferred, no maintenance IV if able   - VTE PPx: enoxaparin or heparin SQ unless contraindicated    - Antibiotics:  - if indications, CXR findings, elevated procal. See protocol for alternatives.   - Discontinue early if low concern for bacterial co-infection   - ceftriaxone 1g Q24h x 5 days      - Investigational Therapies:   - If patient meets criteria   - patient previously completed course of Remdesivir     Acute Hypoxemic Respiratory Failure  - Order RT consult via Respiratory Communication for COVID Protocols  - Order Incentive Spirometer Q4h  - Order Flutter Valve Q4h  - Continuous Pulse Oximetry  - Goal SpO2 92-96%  - Supplemental O2 via LFNC, VentiMask, or HFNC (see Respiratory Support Oxygen Therapies)  - If wheezing, albuterol INH Q6h scheduled & PRN  - Proning Protocol if patient is a candidate (see [facility] Proning Protocol)   - GCS >13, able to self-prone  - If deterioration, may warrant trial of NIPPV and transfer to [facility] pressure room or immediate ICU consult  - Completed remdesivir and dexamethasone course    - 10/8 started vanc + cefipime for HAP coverage    - 10/9 Patient with increasing Oxygen requirements from 30L -> 45L 100%  - MICU evaluated patient during this time and agreed she is stable and are aware of her  -  Patient is DNI, see palliative encounter for more details  - On BIPAP since 10/9 with overall improvement of initial symptoms. Still in respiratory distress  - trial of low-dose morphine for dyspnea

## 2020-10-11 NOTE — NURSING
Removed previous IV sites due to expiration date, tips intact. Initiated 20 gauge IV site to left forearm, god blod return noted. Patient tolerated well.

## 2020-10-11 NOTE — CONSULTS
"  [facility] - ICU 15 WT  Adult Nutrition  Consult Note    SUMMARY     Recommendations    1. If able, encourage PO intake as tolerated. Recommend Boost Plus TID to increase protein and calorie intake.     2. If unable to take PO, consider starting NG feeds of Isosource 1.5 at 10mL/hr and advance to goal rate of 40mL/hr to provide 1440kcals, 65g Protein, and 733mL fluid.     3. Monitor Pts weight 3x/week.     4. RD to continue following.     Goals: 1. Pt to meet >75% EEN and EPN by RD follow up.  Nutrition Goal Status: new  Communication of RD Recs: other (POC)    Reason for Assessment    Reason For Assessment: consult  Diagnosis: other (Pneumonia due to COVID-19 virus)  Relevant Medical History: breast cancer  Interdisciplinary Rounds: did not attend  General Information Comments: Pt remains on BiPap with chest tubes. Pt has a regular diet ordered but has been NPO due to work of breathing. No weight history per chart review. Unable to complete NFPE at this time due to hospital safety precautions for COVID-19. Pt could possibly become malnourished if NPO continues.   Nutrition Discharge Planning: Unclear at this time.     Nutrition Risk Screen     Nutrition Risk Screen: dysphagia or difficulty swallowing    Anthropometrics    Temp: 98.1 °F (36.7 °C)  Height: 5' 4" (162.6 cm)  Height (inches): 64 in  Weight: 60.8 kg (134 lb)  Weight (lb): 134 lb  Ideal Body Weight (IBW), Female: 120 lb  % Ideal Body Weight, Female (lb): 111.67 %  BMI (Calculated): 23       Lab/Procedures/Meds    Pertinent Labs Reviewed: reviewed  Pertinent Labs Comments: (Na 147, BUN34, Ca 7.2, Phos 2.1, Alb 2)  Pertinent Medications Reviewed: (Vit C, furosemide, MVI)    Estimated/Assessed Needs    Weight Used For Calorie Calculations: 61 kg (134 lb 7.7 oz)  Energy Calorie Requirements (kcal): 1343 kcals/d  Energy Need Method: McIntyre-St Jeor(x1.25)  Protein Requirements: 61-73 gm/d  Weight Used For Protein Calculations: 61 kg (134 lb 7.7 " oz)     Estimated Fluid Requirement Method: RDA Method  RDA Method (mL): 1343       Nutrition Prescription Ordered    Current Diet Order: Regular  Nutrition Order Comments: Diet ordered, Pt NPO    Evaluation of Received Nutrient/Fluid Intake    I/O: 2.9L since admit  Comments: LBM 10/7  % Intake of Estimated Energy Needs: 0 - 25 %  % Meal Intake: 0 - 25 %    Nutrition Risk    Level of Risk/Frequency of Follow-up: (2x/week)     Assessment and Plan  Nutrition Problem  Inadequate energy intake    Related to (etiology):   Decreased ability to consume adequate energy    Signs and Symptoms (as evidenced by):   NPO, difficulty breathing    Interventions/Recommendations (treatment strategy):  ONS Boost Plus TID  EN support    Nutrition Diagnosis Status:   new      Monitor and Evaluation    Food and Nutrient Intake: energy intake, food and beverage intake  Food and Nutrient Adminstration: diet order  Anthropometric Measurements: weight, weight change  Biochemical Data, Medical Tests and Procedures: electrolyte and renal panel, gastrointestinal profile, glucose/endocrine profile, inflammatory profile, lipid profile  Nutrition-Focused Physical Findings: overall appearance          Nutrition Follow-Up    RD Follow-up?: Yes

## 2020-10-11 NOTE — ASSESSMENT & PLAN NOTE
Patient says she is anxious  -- Gave 0.25 xanax with no effect  -- Gave 0.5 IV ativan with improvement of symptoms  -- 0.5 IV ativan q6h PRN  -- Use with caution
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
## 2020-10-11 NOTE — CARE UPDATE
Rapid Response Nurse Chart Check     Chart check completed, abnormal VS noted. Please call [phone] for further concerns or assistance.

## 2020-10-11 NOTE — NURSING
Adm Ativan 0.5 mg IVP per pateient's anxiety and agitation AEB crying, moaning, and fidgeting at BIPAP mask. Patient's son at bedside.

## 2020-10-11 NOTE — CARE UPDATE
"RAPID RESPONSE NURSE PROACTIVE ROUNDING NOTE     Time of Visit: 1012    Admit Date: 10/8/2020  LOS: 3  Code Status: Partial Code   Date of Visit: 10/11/2020  : 1940  Age: 79 y.o.  Sex: female  Race: White  Bed: 43915/78313 A:   MRN: [id number]  Was the patient discharged from an ICU this admission? no   Was the patient discharged from a PACU within last 24 hours?  no  Did the patient receive conscious sedation/general anesthesia in last 24 hours?  no  Was the patient in the ED within the past 24 hours?  no  Was the patient started on NIPPV within the past 24 hours?  no  Attending Physician: Max Otero MD  Primary Service: [facility] MED 1    ASSESSMENT     Notified by 15WT Proactive Round .  Reason for alert: Proactive    Diagnosis: Pneumonia due to COVID-19 virus    Abnormal Vital Signs: BP (!) 159/69 (BP Location: Left arm, Patient Position: Lying)   Pulse 100   Temp 98.1 °F (36.7 °C) (Axillary)   Resp (!) 42   Ht 5' 4" (1.626 m)   Wt 60.8 kg (134 lb)   SpO2 99%   BMI 23.00 kg/m²      Clinical Issues: Respiratory    Patient  has no past medical history on file.      Patient remains of BiPaP. DNI remains active. FiO2 has been lowered to 75%. Per primary RN nutritional status will be addressed today. Possible TPA instillation for chest tube with CTS. Family member is currently in route for visit.      INTERVENTIONS/ RECOMMENDATIONS     Continue with current plan of care.     Discussed plan of care with RNUbaldo.    PHYSICIAN ESCALATION     Yes/No  no    Orders received and case discussed with NA.    Disposition: Remain in room 37129.    FOLLOW-UP     Call back the Rapid Response Nurse, Irineo Lauren RN at [phone] for additional questions or concerns.          "

## 2020-10-11 NOTE — PT/OT/SLP PROGRESS
Physical Therapy      Patient Name:  Kami Barbosa   MRN:  [id number]    POC discussed with RN. PT with multiple attempts to see pt, pt consistently not appropriate for therapy intervention 2/2 poor oxygen status requiring continuous BiPAP at rest with minimal options in even of decompensation given pt DNI. Additionally, pt with poor ability to participate, evidence of significant pain and intolerance to movement in bed. Given current presentation, PT will discontinue orders at this time. Please re-consult if pt becomes appropriate for acute inpatient rehabilitation intervention.     Veronique Branch, PT

## 2020-10-11 NOTE — CARE UPDATE
Rapid Response Nurse Follow-up Note     Followed up with patient for proactive rounding.   No acute issues at this time.   Remains on 75% FiO2 on bipap - spO2 90-94% with noted tachypnea 20-30 breaths/min.  Please call Rapid Response RN, Dot Leos RN with any questions or concerns at [phone].

## 2020-10-11 NOTE — SUBJECTIVE & OBJECTIVE
Interval History: Patient continues on bipap this am.    Review of Systems   Constitutional: Negative for fever.   HENT: Negative for congestion and rhinorrhea.    Eyes: Negative for visual disturbance.   Respiratory: Negative for shortness of breath.    Cardiovascular: Negative for chest pain.   Gastrointestinal: Negative for abdominal pain, nausea and vomiting.   Genitourinary: Negative for difficulty urinating.   Musculoskeletal: Negative for arthralgias.   Skin: Negative for color change.   Neurological: Negative for dizziness and light-headedness.   Psychiatric/Behavioral: The patient is nervous/anxious.      Objective:     Vital Signs (Most Recent):  Temp: 98.1 °F (36.7 °C) (10/11/20 0751)  Pulse: 106 (10/11/20 1119)  Resp: (!) 26 (10/11/20 0751)  BP: (!) 159/69 (10/11/20 0751)  SpO2: 97 % (10/11/20 1024) Vital Signs (24h Range):  Temp:  [98.1 °F (36.7 °C)-98.6 °F (37 °C)] 98.1 °F (36.7 °C)  Pulse:  [] 106  Resp:  [24-41] 26  SpO2:  [90 %-98 %] 97 %  BP: (122-159)/(58-69) 159/69     Weight: 60.8 kg (134 lb)  Body mass index is 23 kg/m².    Intake/Output Summary (Last 24 hours) at 10/11/2020 1241  Last data filed at 10/11/2020 1200  Gross per 24 hour   Intake 0 ml   Output 1380 ml   Net -1380 ml      Physical Exam  Vitals signs and nursing note reviewed.   Constitutional:       General: She is in acute distress.      Appearance: She is ill-appearing.   HENT:      Head: Normocephalic and atraumatic.      Nose: Nose normal.   Eyes:      Extraocular Movements: Extraocular movements intact.      Conjunctiva/sclera: Conjunctivae normal.      Pupils: Pupils are equal, round, and reactive to light.   Neck:      Musculoskeletal: Normal range of motion.   Cardiovascular:      Rate and Rhythm: Normal rate and regular rhythm.      Heart sounds: Normal heart sounds. No murmur. No gallop.    Pulmonary:      Effort: Respiratory distress present.      Breath sounds: No wheezing or rales.   Abdominal:      General:  Abdomen is flat. There is no distension.      Palpations: Abdomen is soft.      Tenderness: There is no abdominal tenderness. There is no guarding.   Musculoskeletal: Normal range of motion.   Skin:     General: Skin is warm.   Neurological:      General: No focal deficit present.      Mental Status: She is oriented to person, place, and time.

## 2020-10-11 NOTE — PROGRESS NOTES
Pharmacist Renal Dose Adjustment Note    Kami Barbosa is a 79 y.o. female being treated with the medication cefepime    Patient Data:    Vital Signs (Most Recent):  Temp: 98.1 °F (36.7 °C) (10/11/20 0751)  Pulse: 106 (10/11/20 1119)  Resp: (!) 26 (10/11/20 0751)  BP: (!) 159/69 (10/11/20 0751)  SpO2: 96 % (10/11/20 1224)   Vital Signs (72h Range):  Temp:  [97.5 °F (36.4 °C)-98.7 °F (37.1 °C)]   Pulse:  []   Resp:  [24-55]   BP: (122-159)/(58-85)   SpO2:  [88 %-99 %]      Recent Labs   Lab 10/09/20  0731 10/10/20  0503 10/11/20  0230   CREATININE 0.6 0.7 0.7     Serum creatinine: 0.7 mg/dL 10/11/20 0230  Estimated creatinine clearance: 57.2 mL/min    Cefepime 2g IV q8h will be changed to 2g IV q12h for CrCl <60 ml/min.    Harmony Payne, PharmD, BCPS  [phone]

## 2020-10-11 NOTE — PT/OT/SLP PROGRESS
Occupational Therapy      Patient Name:  Kami Barbosa   MRN:  [id number]       POC discussed with RN, Ubaldo. Pt not appropriate for therapy intervention 2/2 poor oxygen status requiring continuous BiPAP at rest with minimal options in even of decompensation given pt DNI. OT  will discontinue orders at this time due to poor abilitie to participate in therapy . Please re-consult if pt becomes appropriate for acute rehab intervention.     Vivi Cortez OT  10/11/2020

## 2020-10-11 NOTE — CARE UPDATE
Rapid Response Nurse Follow-up Note     Followed up with patient for proactive rounding.   No acute issues at this time. Reviewed plan of care with bedside RNBethany. Team will continue to follow.  Please call Rapid Response RN, Irineo Lauren RN with any questions or concerns at [phone].

## 2020-10-11 NOTE — NURSING
Member of thoracic team in to assess chest tube. Re-initiated suction. Patient tolerated well. Patient's son at bedside.

## 2020-10-11 NOTE — ASSESSMENT & PLAN NOTE
Patient states she would like to be DNI  -- Spoke to daughter and updated on medical condition   -- Palliative care consulted and met with patient and spoke to patient's daughter  -- Appreciate assistance  -- Patient's   a few years ago from lung cancer  -- Daughter and son now in town alternating visits

## 2020-10-11 NOTE — PLAN OF CARE
Problem: Adult Inpatient Plan of Care  Goal: Plan of Care Review  Outcome: Ongoing, Progressing     Problem: Infection  Goal: Infection Symptom Resolution  Outcome: Ongoing, Progressing   Attempted to wean patient's FiO2 down on BIPAP, currently at 75% with O2 sats of 95-98%. While on 70% FiO2, her O2 sats were 88-90%. Patient's right sided chest tube is intact without any air leak noted. Total output for NOC shift was 80cc, 24hr period of 180cc. Patient's daughter, Lorraine, updated on patient's current condition and plan of care. Patient given complete bed bath by this RN and PCT Yudy with minimal shortness of breath. Pt c/o pain to chest tube site and tachypnea. Med Team 1 notified and new order given for Morphine 1mg x1 dose. Patient responded well to morphine with full relief. Patient currently resting in bed without any c/o pain or discomfort at this time. Call bell in reach, telesitter at bedside, will continue to monitor. Nutritional consult ordered for recommendations regarding nutrition considering patient NPO for greater than 24hours.

## 2020-10-11 NOTE — PROGRESS NOTES
[facility] - ICU [facility] Medicine  Progress Note    Patient Name: Kami Barbosa  MRN: [id number]  Patient Class: IP- Inpatient   Admission Date: 10/8/2020  Length of Stay: 3 days  Attending Physician: Max Otero MD  Primary Care Provider: Primary Doctor No        Subjective:     Principal Problem:Pneumonia due to COVID-19 virus        HPI:  Ms. Barbosa is a 79-year-old female with past medical history of breast cancer who presented to [facility] on 09/26 with right shoulder pain and shortness of breath x 2 days. She was admitted for COVID pneumonia.  She has since completed a 10 day course of steroids, Remdesivir, and azithromycin.  She has required high-flow oxygen, at 30 L with 50% FiO2 to maintain adequate oxygenation. Hospital course was complicated by right pleural effusion requiring chest tube placement.  Cytology results are not available from pleural cultures.  After resolution of pleural effusion, chest tube was removed. However, shortly thereafter, the patient developed pneumothorax prompting placement of a new chest tube.  Medical history limited by lack of records from outside facility.  She was transferred to [facility] for further management due to evacuation protocol.  Upon my assessment, she appears chronically ill and does not appear in acute distress.  She is conversant, aAAO x3 and has no complaints at this time.  She is satting 95% on 40 L high-flow.  Right chest tube currently in place, site is clean, dry and intact.             Overview/Hospital Course:  With moderate respiratory difficulty but with increasing oxygen requirements. 10/9: 30L --> 45L 100% O2. Patient then switched to bipap with some improvement. Evaluated by critical care, who discussed with patient and family and decision was made to continue bipap and make patient DNI. Palliative care following patient and coordinated visitation by children to see patient. Patient remains  on BiPAP.    Interval History: Patient continues on bipap this am.    Review of Systems   Constitutional: Negative for fever.   HENT: Negative for congestion and rhinorrhea.    Eyes: Negative for visual disturbance.   Respiratory: Negative for shortness of breath.    Cardiovascular: Negative for chest pain.   Gastrointestinal: Negative for abdominal pain, nausea and vomiting.   Genitourinary: Negative for difficulty urinating.   Musculoskeletal: Negative for arthralgias.   Skin: Negative for color change.   Neurological: Negative for dizziness and light-headedness.   Psychiatric/Behavioral: The patient is nervous/anxious.      Objective:     Vital Signs (Most Recent):  Temp: 98.1 °F (36.7 °C) (10/11/20 0751)  Pulse: 106 (10/11/20 1119)  Resp: (!) 26 (10/11/20 0751)  BP: (!) 159/69 (10/11/20 0751)  SpO2: 97 % (10/11/20 1024) Vital Signs (24h Range):  Temp:  [98.1 °F (36.7 °C)-98.6 °F (37 °C)] 98.1 °F (36.7 °C)  Pulse:  [] 106  Resp:  [24-41] 26  SpO2:  [90 %-98 %] 97 %  BP: (122-159)/(58-69) 159/69     Weight: 60.8 kg (134 lb)  Body mass index is 23 kg/m².    Intake/Output Summary (Last 24 hours) at 10/11/2020 1241  Last data filed at 10/11/2020 1200  Gross per 24 hour   Intake 0 ml   Output 1380 ml   Net -1380 ml      Physical Exam  Vitals signs and nursing note reviewed.   Constitutional:       General: She is in acute distress.      Appearance: She is ill-appearing.   HENT:      Head: Normocephalic and atraumatic.      Nose: Nose normal.   Eyes:      Extraocular Movements: Extraocular movements intact.      Conjunctiva/sclera: Conjunctivae normal.      Pupils: Pupils are equal, round, and reactive to light.   Neck:      Musculoskeletal: Normal range of motion.   Cardiovascular:      Rate and Rhythm: Normal rate and regular rhythm.      Heart sounds: Normal heart sounds. No murmur. No gallop.    Pulmonary:      Effort: Respiratory distress present.      Breath sounds: No wheezing or rales.   Abdominal:       General: Abdomen is flat. There is no distension.      Palpations: Abdomen is soft.      Tenderness: There is no abdominal tenderness. There is no guarding.   Musculoskeletal: Normal range of motion.   Skin:     General: Skin is warm.   Neurological:      General: No focal deficit present.      Mental Status: She is oriented to person, place, and time.           Assessment/Plan:      * Pneumonia due to COVID-19 virus  Suspected COVID-19 Virus Infection/ Viral Pneumonia due to COVID-19  - COVID-19 testing: pos at outside facility  - Isolation: Airborne/Droplet. Surgical mask on patient. Notify Infection Control  - Diagnostics: Trend Q48hrs if stable, more frequently if patient decompensating       - Management: [facility]15/20)    - Monitoring:   - Telemetry & Continuous Pulse Oximetry    - Nutrition:    - Multivitamin PO daily   - Add Boost supplement   - Vitamin D 1000IU daily if deficient   - Ascorbic acid 500mg PO bid    - Supportive Care:   - acetaminophen 650mg PO Q6hr PRN fever/headache   - loperamide PRN viral diarrhea   - IVF if indicated, restrictive strategy preferred, no maintenance IV if able   - VTE PPx: enoxaparin or heparin SQ unless contraindicated    - Antibiotics:  - if indications, CXR findings, elevated procal. See protocol for alternatives.   - Discontinue early if low concern for bacterial co-infection   - ceftriaxone 1g Q24h x 5 days      - Investigational Therapies:   - If patient meets criteria   - patient previously completed course of Remdesivir     Acute Hypoxemic Respiratory Failure  - Order RT consult via Respiratory Communication for COVID Protocols  - Order Incentive Spirometer Q4h  - Order Flutter Valve Q4h  - Continuous Pulse Oximetry  - Goal SpO2 92-96%  - Supplemental O2 via LFNC, VentiMask, or HFNC (see Respiratory Support Oxygen Therapies)  - If wheezing, albuterol INH Q6h scheduled & PRN  - Proning Protocol if patient is a candidate (see  Proning  Protocol)   - GCS >13, able to self-prone  - If deterioration, may warrant trial of NIPPV and transfer to neg pressure room or immediate ICU consult  - Completed remdesivir and dexamethasone course    - 10/8 started vanc + cefipime for HAP coverage    - 10/9 Patient with increasing Oxygen requirements from 30L -> 45L 100%  - MICU evaluated patient during this time and agreed she is stable and are aware of her  - Patient is DNI, see palliative encounter for more details  - On BIPAP since 10/9 with overall improvement of initial symptoms. Still in respiratory distress  - trial of low-dose morphine for dyspnea      Leukocytosis  -- Trending  -- Consider hematology consult      Postprocedural pneumothorax  Resolved    Anxiety  Patient says she is anxious  -- Gave 0.25 xanax with no effect  -- Gave 0.5 IV ativan with improvement of symptoms  -- 0.5 IV ativan q6h PRN  -- Use with caution    Advance care planning  See palliative care encounter    Palliative care encounter  Patient states she would like to be DNI  -- Spoke to daughter and updated on medical condition   -- Palliative care consulted and met with patient and spoke to patient's daughter  -- Appreciate assistance  -- Patient's   a few years ago from lung cancer  -- Daughter and son now in town alternating visits    Pleural effusion  Patient previously diagnosed with breast cancer 2 years prior.  Suggestive pleural effusions likely secondary to underlying malignancy.  Cytology studies have not resulted from outside facility.   -- Thoracic surgery consulted and following for management of chest tube, appreciate recs      VTE Risk Mitigation (From admission, onward)         Ordered     enoxaparin injection 40 mg  Every 24 hours      10/08/20 0930     IP VTE HIGH RISK PATIENT  Once      10/08/20 09     Place sequential compression device  Until discontinued      10/08/20 06                Discharge Planning   CHRIS: 10/15/2020     Code Status: Partial  Code   Is the patient medically ready for discharge?: No    Reason for patient still in hospital (select all that apply): Patient trending condition  Discharge Plan A: Home   Discharge Delays: None known at this time            Chris Raya MD  Department of Hospital Medicine   [facility] - ICU 15 WT

## 2020-10-11 NOTE — NURSING
Reduced patient's FiO2 to 65% on BiPAP per sustained O2 sats of 100%. Will continue to titrate as possible.

## 2020-10-11 NOTE — ASSESSMENT & PLAN NOTE
79 y.o. male admitted for COVID pneumonia complicated by right parapneumonic pleural effusion s/p chest tube placement 9/29 and replacement on 10/4    S/p lytics 10/9   Repeat lytic therapy today   Keep chest tube to suction today   Rest of care per primary team. Guarded prognosis

## 2020-10-11 NOTE — NURSING
Another physician from thoracic team in to see patient and instill medication into chest tube.Patient's son at bedside.

## 2020-10-11 NOTE — PLAN OF CARE
Recommendations     1. If able, encourage PO intake as tolerated. Recommend Boost Plus TID to increase protein and calorie intake.      2. If unable to take PO, consider starting NG feeds of Isosource 1.5 at 10mL/hr and advance to goal rate of 40mL/hr to provide 1440kcals, 65g Protein, and 733mL fluid.      3. Monitor Pts weight 3x/week.      4. RD to continue following.      Goals: 1. Pt to meet >75% EEN and EPN by RD follow up.  Nutrition Goal Status: new

## 2020-10-11 NOTE — SUBJECTIVE & OBJECTIVE
Interval History: No acute changes  Down on FiO2 slightly. Mental status better  CT output 200cc    Medications:  Continuous Infusions:  Scheduled Meds:   alteplase 10 mg in 0.9% NaCl 30 mL  10 mg Chest Tube Once    And    dornase madeleine (PULMOZYME) 5 mg in sterile water 30 mL  5 mg Chest Tube Once    ascorbic acid (vitamin C)  500 mg Oral BID    ceFEPime (MAXIPIME) IVPB  2 g Intravenous Q8H    dornase madeleine (PULMOZYME) 5 mg in sterile water 30 mL  5 mg Chest Tube Once    enoxaparin  40 mg Subcutaneous Q24H    multivitamin  1 tablet Oral Daily    vancomycin (VANCOCIN) IVPB  15 mg/kg (Order-Specific) Intravenous Q12H     PRN Meds:albuterol-ipratropium, dextrose 50%, dextrose 50%, glucagon (human recombinant), glucose, glucose, lorazepam, sodium chloride 0.9%, Pharmacy to dose Vancomycin consult **AND** vancomycin - pharmacy to dose     Review of patient's allergies indicates:   Allergen Reactions    Penicillins     Sulfa (sulfonamide antibiotics)      Objective:     Vital Signs (Most Recent):  Temp: 98.3 °F (36.8 °C) (10/11/20 0000)  Pulse: 107 (10/11/20 0624)  Resp: (!) 24 (10/11/20 0624)  BP: 137/63 (10/11/20 0412)  SpO2: 96 % (10/11/20 0723) Vital Signs (24h Range):  Temp:  [97.9 °F (36.6 °C)-98.6 °F (37 °C)] 98.3 °F (36.8 °C)  Pulse:  [] 107  Resp:  [24-41] 24  SpO2:  [90 %-98 %] 96 %  BP: (122-137)/(58-63) 137/63     Intake/Output - Last 3 Shifts       10/09 0700 - 10/10 0659 10/10 0700 - 10/11 0659 10/11 0700 - 10/12 0659    P.O. 120 0     Total Intake(mL/kg) 120 (2) 0 (0)     Urine (mL/kg/hr) 1075 (0.7) 1200 (0.8)     Other  0     Stool  0     Chest Tube 340 180     Total Output 1415 1380     Net -1295 -1380            Stool Occurrence  1 x           SpO2: 96 %  O2 Device (Oxygen Therapy): BiPAP    Physical Exam  Vitals signs and nursing note reviewed.   Constitutional:       Appearance: Normal appearance.   HENT:      Head: Normocephalic.   Eyes:      Extraocular Movements: Extraocular movements  intact.   Neck:      Musculoskeletal: Normal range of motion.   Cardiovascular:      Rate and Rhythm: Normal rate and regular rhythm.   Pulmonary:      Effort: No respiratory distress.      Breath sounds: No stridor.      Comments: Right chest tube in place with dark SSoutput  Abdominal:      General: Abdomen is flat.      Palpations: Abdomen is soft.   Skin:     General: Skin is warm.   Neurological:      Mental Status: She is alert.         Significant Labs:  ABGs:   Recent Labs   Lab 10/10/20  0255   PH 7.506*   PCO2 27.1*   PO2 70*   HCO3 21.4*   POCSATURATED 96   BE -2     Amylase: No results for input(s): AMYLASE in the last 48 hours.  BMP:   Recent Labs   Lab 10/11/20  0230   GLU 77   *   K 3.6      CO2 22*   BUN 34*   CREATININE 0.7   CALCIUM 7.2*   MG 2.7*     Cardiac markers: No results for input(s): CKMB, CPKMB, TROPONINT, TROPONINI, MYOGLOBIN in the last 48 hours.  CBC:   Recent Labs   Lab 10/11/20  0230   WBC 68.75*   RBC 3.93*   HGB 11.1*   HCT 36.0*      MCV 92   MCH 28.2   MCHC 30.8*     CMP:   Recent Labs   Lab 10/11/20  0230   GLU 77   CALCIUM 7.2*   ALBUMIN 2.0*   PROT 5.7*   *   K 3.6   CO2 22*      BUN 34*   CREATININE 0.7   ALKPHOS 216*   ALT 16   AST 25   BILITOT 0.6       Significant Diagnostics:  I have reviewed all pertinent imaging results/findings within the past 24 hours.    VTE Risk Mitigation (From admission, onward)         Ordered     enoxaparin injection 40 mg  Every 24 hours      10/08/20 0930     IP VTE HIGH RISK PATIENT  Once      10/08/20 0930     Place sequential compression device  Until discontinued      10/08/20 0627

## 2020-10-12 NOTE — CONSULTS
"  Nutrition consult received stating "Poor PO intake, sick COVID on BiPAP, please assess nutritional needs".  Please see note from 10/11 for full RD assessment, RD following.    Recommendations     1. If able, encourage PO intake as tolerated. Recommend Boost Plus TID to increase protein and calorie intake.      2. If unable to take PO, consider starting NG feeds of Isosource 1.5 at 10mL/hr and advance to goal rate of 40mL/hr to provide 1440kcals, 65g Protein, and 733mL fluid.      3. Monitor Pts weight 3x/week.      4. RD to continue following.      Goals: 1. Pt to meet >75% EEN and EPN by RD follow up.  Nutrition Goal Status: new  Communication of RD Recs: other (POC)    Thanks!  DAKOTAH Araujo, LDN [id number]    "

## 2020-10-12 NOTE — ASSESSMENT & PLAN NOTE
Impression: Pt is a 80 y/o female with past medical history of breast cancer who presented to [facility] on  with right shoulder pain and shortness of breath x 2 days. She was admitted for COVID pneumonia.  She has since completed a 10 day course of steroids, Remdesivir, and azithromycin. Pt is on Bi-PAP at 100% O2. Pt is sleeping. Not able to participate in conversation at this time. Pt is a DNI.     Reason for consult: Spoke to Dr. Raya. [facility] care cosulted for support/advance care-planning. Pt made DNI today per pt's request.     Today: Met with daughter, Lorraine who is at bedside. Pt on Bi-Pap. Opens eyes to voice and able to communicate somewhat.   Support given to pt's daughter at bedside. Daughters is aware of pt's respiratory issues including chest tube for pleural effusion.     10/9/2020  Rounded on pt. Pt is on Bi-PAP. Pt sleeping. Pt opened eyes to voice but drifts back to sleep. Unable to have a meaningful conversation.   Called and spoke to pt's daughter Lorraine over-the-phone. She is bracing for [place] in [facility]. Daughter is aware pt made DNI today. Per daughter, pt's   of lung cancer a few years ago and they had had the conversation about intubation. Per daughter, she was not surprised pt wanted to be a DNI. Per daughter, she respects her wishes. Updated daughter on new visiting policy. Per Lorraine, pt's daughter Samantha will most likely visit tomorrow due to being closer to [facility].     [facility] care number given to daughter to call with any questions, concerns.     Support given.    Plan:   Pt is DNI.   Pt is on Bi-PAP continuing aggressive treatment.   Family is aware of pt's medical condition and code status.   Daughter, Lorraine is at bedside today.   Will follow.

## 2020-10-12 NOTE — NURSING
Adm Ativan 0.5 mg IVP per patient c/o agitation and anxiety AEB crying and picking at lines and cannula. Daughter at bedside.

## 2020-10-12 NOTE — PLAN OF CARE
Problem: Adult Inpatient Plan of Care  Goal: Plan of Care Review  Outcome: Ongoing, Progressing     Problem: Infection  Goal: Infection Symptom Resolution  Outcome: Ongoing, Progressing   No acute events overnight. Able to wean oxygen on BIPAP to 65% FiO2 with O2 sats maintaining 95-98%. Patient's CT with adequate output of 320cc on NOC shift. CT dsg changed and CDI. Patient educated on current plan of care and verbalizes understanding. Patient resting in bed comfortably, call bell in reach, safety measures in place. Will continue to monitor.

## 2020-10-12 NOTE — PROGRESS NOTES
Pharmacokinetic Assessment Follow Up: IV Vancomycin    Vancomycin serum concentration assessment/plan:  - Vancomycin trough was drawn correctly (~10.5 hours after dose) and resulted at 16.9 mcg/ml. This is within target range of 15 - 20 mcg/ml  - Will continue current dose of 1000mg IV every 12 hours with repeat trough in ~3-5 days or sooner if renal function declines (no level currently ordered)  - Pharmacy will continue to follow    Drug levels (last 3 results):  Recent Labs   Lab Result Units 10/10/20  0956 10/11/20  2059   Vancomycin-Trough ug/mL 4.5* 16.9       Pharmacy will continue to follow and monitor vancomycin.    Please contact pharmacy at extension [phone] for questions regarding this assessment.    Thank you for the consult,   Sherri Lind       Patient brief summary:  Kami Barbosa is a 79 y.o. female initiated on antimicrobial therapy with IV Vancomycin for treatment of lower respiratory infection    The patient's current regimen is 1000mg IV every 12 hours    Drug Allergies:   Review of patient's allergies indicates:   Allergen Reactions    Penicillins      Tolerates cefepime    Sulfa (sulfonamide antibiotics)        Actual Body Weight:   61 kg    Renal Function:   Estimated Creatinine Clearance: 57.2 mL/min (based on SCr of 0.7 mg/dL).,       CBC (last 72 hours):  Recent Labs   Lab Result Units 10/09/20  0731 10/10/20  0503 10/11/20  0230   WBC K/uL 59.07* 79.21* 68.75*   Hemoglobin g/dL 11.1* 12.1 11.1*   Hematocrit % 35.8* 39.4 36.0*   Platelets K/uL 204 223 225   Gran% % 96.0* 98.0* 88.0*   Lymph% % 1.0* 1.0* 7.0*   Mono% % 2.5* 1.0* 4.0   Eosinophil% % 0.0 0.0 0.0   Basophil% % 0.0 0.0 0.0   Differential Method  Manual Automated Manual       Metabolic Panel (last 72 hours):  Recent Labs   Lab Result Units 10/09/20  0731 10/10/20  0503 10/11/20  0230   Sodium mmol/L 142 144 147*   Potassium mmol/L 4.4 4.2 3.6   Chloride mmol/L 108 106 107   CO2 mmol/L 20* 21* 22*   Glucose mg/dL 97 87 77   BUN, Bld  mg/dL 23 27* 34*   Creatinine mg/dL 0.6 0.7 0.7   Albumin g/dL 1.9* 2.1* 2.0*   Total Bilirubin mg/dL 0.5 0.8 0.6   Alkaline Phosphatase U/L 274* 277* 216*   AST U/L 34 36 25   ALT U/L 19 19 16   Magnesium mg/dL 2.4 2.8* 2.7*   Phosphorus mg/dL 1.6* 2.1* 2.1*       Vancomycin Administrations:  vancomycin given in the last 96 hours                   vancomycin in dextrose 5 % 1 gram/250 mL IVPB 1,000 mg (mg) 1,000 mg New Bag 10/11/20 1027     1,000 mg New Bag 10/10/20 2152    vancomycin in dextrose 5 % 1 gram/250 mL IVPB 1,000 mg (mg) 1,000 mg New Bag 10/10/20 1038     1,000 mg New Bag 10/09/20 0943    vancomycin 1.25 g in dextrose 5% 250 mL IVPB (ready to mix) (mg) 1,250 mg New Bag 10/08/20 1402                Microbiologic Results:  Microbiology Results (last 7 days)     Procedure Component Value Units Date/Time    Blood culture [[id number]] Collected: 10/08/20 0725    Order Status: Completed Specimen: Blood Updated: 10/11/20 1012     Blood Culture, Routine No Growth to date      No Growth to date      No Growth to date      No Growth to date    Clostridium difficile EIA [[id number]]     Order Status: Canceled Specimen: Stool     Culture, Respiratory with Gram Stain [[id number]]     Order Status: No result Specimen: Respiratory from Sputum     Blood culture [[id number]]     Order Status: Canceled Specimen: Blood

## 2020-10-12 NOTE — SUBJECTIVE & OBJECTIVE
Interval History: Seen and examined this morning. No acute events overnight. Patient has maintained sats > 90% on BiPAP overnight. Has bneen on BiPAP for a few days, but FiO2 requirements have decreased from 85 -> 75> 55 today. Patient's daughter at bedside. All questions and concerns addressed with patient and family at bedside.    Review of Systems   Constitutional: Negative for fever.   HENT: Negative for congestion and rhinorrhea.    Eyes: Negative for visual disturbance.   Respiratory: Positive for shortness of breath.    Cardiovascular: Negative for chest pain.   Gastrointestinal: Negative for abdominal pain, nausea and vomiting.   Genitourinary: Negative for difficulty urinating.   Musculoskeletal: Negative for arthralgias.   Skin: Negative for color change.   Neurological: Negative for dizziness and light-headedness.   Psychiatric/Behavioral: The patient is nervous/anxious.      Objective:     Vital Signs (Most Recent):  Temp: 98.3 °F (36.8 °C) (10/12/20 0400)  Pulse: 104 (10/12/20 0740)  Resp: (!) 30 (10/12/20 0624)  BP: 134/60 (10/12/20 0400)  SpO2: 95 % (10/12/20 0900) Vital Signs (24h Range):  Temp:  [98.2 °F (36.8 °C)-98.3 °F (36.8 °C)] 98.3 °F (36.8 °C)  Pulse:  [] 104  Resp:  [27-42] 30  SpO2:  [85 %-100 %] 95 %  BP: (134-138)/(56-63) 134/60     Weight: 60.8 kg (134 lb)  Body mass index is 23 kg/m².    Intake/Output Summary (Last 24 hours) at 10/12/2020 1010  Last data filed at 10/12/2020 0650  Gross per 24 hour   Intake 0 ml   Output 1320 ml   Net -1320 ml      Physical Exam  Vitals signs and nursing note reviewed.   Constitutional:       General: She is in acute distress.      Appearance: She is ill-appearing.   HENT:      Head: Normocephalic and atraumatic.      Nose: Nose normal.   Eyes:      Extraocular Movements: Extraocular movements intact.      Conjunctiva/sclera: Conjunctivae normal.      Pupils: Pupils are equal, round, and reactive to light.   Neck:      Musculoskeletal: Normal range  of motion.   Cardiovascular:      Rate and Rhythm: Normal rate and regular rhythm.      Heart sounds: Normal heart sounds. No murmur. No gallop.    Pulmonary:      Effort: Respiratory distress present.      Breath sounds: No wheezing or rales.   Abdominal:      General: Abdomen is flat. There is no distension.      Palpations: Abdomen is soft.      Tenderness: There is no abdominal tenderness. There is no guarding.   Musculoskeletal: Normal range of motion.   Skin:     General: Skin is warm.   Neurological:      General: No focal deficit present.      Mental Status: She is oriented to person, place, and time.         Significant Labs:   ABGs: No results for input(s): PH, PCO2, HCO3, POCSATURATED, BE, TOTALHB, COHB, METHB, O2HB, POCFIO2 in the last 48 hours.  CBC:   Recent Labs   Lab 10/11/20  0230 10/12/20  0657   WBC 68.75* 55.55*   HGB 11.1* 11.3*   HCT 36.0* 37.1    207     CMP:   Recent Labs   Lab 10/11/20  0230 10/12/20  0657   * 145   K 3.6 4.5    110   CO2 22* 22*   GLU 77 114*   BUN 34* 33*   CREATININE 0.7 0.6   CALCIUM 7.2* 7.0*   PROT 5.7* 5.5*   ALBUMIN 2.0* 1.8*   BILITOT 0.6 0.6   ALKPHOS 216* 169*   AST 25 20   ALT 16 13   ANIONGAP 18* 13   EGFRNONAA >60.0 >60.0     Respiratory Culture: No results for input(s): GSRESP, RESPIRATORYC in the last 48 hours.  All pertinent labs within the past 24 hours have been reviewed.    Significant Imaging: I have reviewed all pertinent imaging results/findings within the past 24 hours.

## 2020-10-12 NOTE — ASSESSMENT & PLAN NOTE
79 y.o. male admitted for COVID pneumonia complicated by right parapneumonic pleural effusion s/p chest tube placement 9/29 and replacement on 10/4    S/p lytics 10/9, 10/11   Chest tube to water seal   Rest of care per primary team. Guarded prognosis

## 2020-10-12 NOTE — PROGRESS NOTES
[facility] - ICU 15   Palliative Medicine  Progress Note    Patient Name: Kami Barbosa  MRN: [id number]  Admission Date: 10/8/2020  Hospital Length of Stay: 4 days  Code Status: Partial Code   Attending Provider: Max Otero MD  Consulting Provider: SENDY Cedeño  Primary Care Physician: Primary Doctor No  Principal Problem:Pneumonia due to COVID-19 virus    Patient information was obtained from patient, relative(s) and ER records.      Assessment/Plan:     Palliative care encounter  Impression: Pt is a 80 y/o female with past medical history of breast cancer who presented to [facility] on  with right shoulder pain and shortness of breath x 2 days. She was admitted for COVID pneumonia.  She has since completed a 10 day course of steroids, Remdesivir, and azithromycin. Pt is on Bi-PAP at 100% O2. Pt is sleeping. Not able to participate in conversation at this time. Pt is a DNI.     Reason for consult: Spoke to Dr. Raya. [facility] cosulted for support/advance care-planning. Pt made DNI today per pt's request.     Today: Met with daughter, Lorraine who is at bedside. Pt on Bi-Pap. Opens eyes to voice and able to communicate somewhat.   Support given to pt's daughter at bedside. Daughters is aware of pt's respiratory issues including chest tube for pleural effusion.     10/9/2020  Rounded on pt. Pt is on Bi-PAP. Pt sleeping. Pt opened eyes to voice but drifts back to sleep. Unable to have a meaningful conversation.   Called and spoke to pt's daughter Lorraine over-the-phone. She is bracing for [place] in [facility]. Daughter is aware pt made DNI today. Per daughter, pt's   of lung cancer a few years ago and they had had the conversation about intubation. Per daughter, she was not surprised pt wanted to be a DNI. Per daughter, she respects her wishes. Updated daughter on new visiting policy. Per Lorraine, pt's daughter Samantha will most likely visit  tomorrow due to being closer to [facility].     [facility] care number given to daughter to call with any questions, concerns.     Support given.    Plan:   Pt is DNI.   Pt is on Bi-PAP continuing aggressive treatment.   Family is aware of pt's medical condition and code status.   DaughterLorraine is at bedside today.   Will follow.           I will follow-up with patient. Please contact us if you have any additional questions.    Subjective:     Chief Complaint: No chief complaint on file.      HPI:   Pt is a 79-year-old female with past medical history of breast cancer who presented to [facility] on 09/26 with right shoulder pain and shortness of breath x 2 days. Per chart review, she was admitted for COVID pneumonia.  She has since completed a 10 day course of steroids, Remdesivir, and azithromycin.  She has required high-flow oxygen, at 30 L with 50% FiO2 to maintain adequate oxygenation. Hospital course was complicated by right pleural effusion requiring chest tube placement.  Cytology results are not available from pleural cultures.  After resolution of pleural effusion, chest tube was removed. However, shortly thereafter, the patient developed pneumothorax prompting placement of a new chest tube.  Medical history limited by lack of records from outside facility.  She was transferred to [facility] for further management due to evacuation protocol.  Upon my assessment, she appears chronically ill and does not appear in acute distress. On admit, she is conversant, AAO x3 and has no complaints at this time.  She was satting 95% on 40 L high-flow.  Right chest tube currently in place, site is clean, dry and intact.    Pt is a DNI    Hospital Course:  No notes on file    Interval History: DNI/on Bi-PAP at 100 %    No past medical history on file.    No past surgical history on file.    Review of patient's allergies indicates:   Allergen Reactions    Penicillins      Tolerates  cefepime    Sulfa (sulfonamide antibiotics)        Medications:  Continuous Infusions:  Scheduled Meds:   ascorbic acid (vitamin C)  500 mg Oral BID    ceFEPime (MAXIPIME) IVPB  2 g Intravenous Q12H    dornase madeleine (PULMOZYME) 5 mg in sterile water 30 mL  5 mg Chest Tube Once    enoxaparin  40 mg Subcutaneous Q24H    multivitamin  1 tablet Oral Daily    potassium phosphate IVPB  20 mmol Intravenous Once    vancomycin (VANCOCIN) IVPB  15 mg/kg (Order-Specific) Intravenous Q12H     PRN Meds:albuterol-ipratropium, dextrose 50%, dextrose 50%, glucagon (human recombinant), glucose, glucose, lorazepam, sodium chloride 0.9%, Pharmacy to dose Vancomycin consult **AND** vancomycin - pharmacy to dose    Family History     None        Tobacco Use    Smoking status: Not on file   Substance and Sexual Activity    Alcohol use: Not on file    Drug use: Not on file    Sexual activity: Not on file       Review of Systems   Unable to perform ROS: Acuity of condition     Objective:     Vital Signs (Most Recent):  Temp: 98.3 °F (36.8 °C) (10/12/20 0400)  Pulse: 104 (10/12/20 0740)  Resp: (!) 30 (10/12/20 0624)  BP: 134/60 (10/12/20 0400)  SpO2: 95 % (10/12/20 0900) Vital Signs (24h Range):  Temp:  [98.2 °F (36.8 °C)-98.3 °F (36.8 °C)] 98.3 °F (36.8 °C)  Pulse:  [] 104  Resp:  [27-42] 30  SpO2:  [85 %-100 %] 95 %  BP: (134-138)/(56-63) 134/60     Weight: 60.8 kg (134 lb)  Body mass index is 23 kg/m².    Physical Exam  Constitutional:       General: She is sleeping.      Comments: Pt on Bi-Pap   HENT:      Head: Normocephalic.   Pulmonary:      Comments: On  BiPap  Musculoskeletal: Normal range of motion.   Neurological:      Comments: Pt sleeping. Opens eyes to touch but drifts back to sleep.          Review of Symptoms    Symptom Assessment (ESAS 0-10 Scale)  Pain:  0  Dyspnea:  0  Anxiety:  0  Nausea:  0  Depression:  0  Anorexia:  0  Fatigue:  0  Insomnia:  0  Restlessness:  0  Agitation:  0         Comments:   Unable to do ROS due to pt's mental status.           ECOG Performance Status Grade:  4 - Completely disabled    Psychosocial/Cultural: Pt's  is . He  of lung cancer. Pt has two daughters and one son.       Advance Care Planning   Advance Directives:   Living Will: No    [place]: No    Medical Power of : No    Agent's Name:  Pt's adult children are next of kin.     Decision Making:  Family answered questions         Significant Labs: All pertinent labs within the past 24 hours have been reviewed.  CBC:   Recent Labs   Lab 10/12/20  0657   WBC 55.55*   HGB 11.3*   HCT 37.1   MCV 95        BMP:  Recent Labs   Lab 10/12/20  0657   *      K 4.5      CO2 22*   BUN 33*   CREATININE 0.6   CALCIUM 7.0*   MG 2.8*     LFT:  Lab Results   Component Value Date    AST 20 10/12/2020    ALKPHOS 169 (H) 10/12/2020    BILITOT 0.6 10/12/2020     Albumin:   Albumin   Date Value Ref Range Status   10/12/2020 1.8 (L) 3.5 - 5.2 g/dL Final     Protein:   Total Protein   Date Value Ref Range Status   10/12/2020 5.5 (L) 6.0 - 8.4 g/dL Final     Lactic acid:   Lab Results   Component Value Date    LACTATE 3.3 (H) 10/11/2020    LACTATE 3.1 (H) 10/08/2020       Significant Imaging: I have reviewed all pertinent imaging results/findings within the past 24 hours.      > 50% of 35 min visit spent in chart review, face to face discussion of goals of care,  symptom assessment, coordination of care and emotional support.    Gretel Pardo, CNS  Palliative Medicine  [facility] - ICU 15 WT

## 2020-10-12 NOTE — SUBJECTIVE & OBJECTIVE
Interval History: DNI/on Bi-PAP at 100 %    No past medical history on file.    No past surgical history on file.    Review of patient's allergies indicates:   Allergen Reactions    Penicillins      Tolerates cefepime    Sulfa (sulfonamide antibiotics)        Medications:  Continuous Infusions:  Scheduled Meds:   ascorbic acid (vitamin C)  500 mg Oral BID    ceFEPime (MAXIPIME) IVPB  2 g Intravenous Q12H    dornase madeleine (PULMOZYME) 5 mg in sterile water 30 mL  5 mg Chest Tube Once    enoxaparin  40 mg Subcutaneous Q24H    multivitamin  1 tablet Oral Daily    potassium phosphate IVPB  20 mmol Intravenous Once    vancomycin (VANCOCIN) IVPB  15 mg/kg (Order-Specific) Intravenous Q12H     PRN Meds:albuterol-ipratropium, dextrose 50%, dextrose 50%, glucagon (human recombinant), glucose, glucose, lorazepam, sodium chloride 0.9%, Pharmacy to dose Vancomycin consult **AND** vancomycin - pharmacy to dose    Family History     None        Tobacco Use    Smoking status: Not on file   Substance and Sexual Activity    Alcohol use: Not on file    Drug use: Not on file    Sexual activity: Not on file       Review of Systems   Unable to perform ROS: Acuity of condition     Objective:     Vital Signs (Most Recent):  Temp: 98.3 °F (36.8 °C) (10/12/20 0400)  Pulse: 104 (10/12/20 0740)  Resp: (!) 30 (10/12/20 0624)  BP: 134/60 (10/12/20 0400)  SpO2: 95 % (10/12/20 0900) Vital Signs (24h Range):  Temp:  [98.2 °F (36.8 °C)-98.3 °F (36.8 °C)] 98.3 °F (36.8 °C)  Pulse:  [] 104  Resp:  [27-42] 30  SpO2:  [85 %-100 %] 95 %  BP: (134-138)/(56-63) 134/60     Weight: 60.8 kg (134 lb)  Body mass index is 23 kg/m².    Physical Exam  Constitutional:       General: She is sleeping.      Comments: Pt on Bi-Pap   HENT:      Head: Normocephalic.   Pulmonary:      Comments: On  BiPap  Musculoskeletal: Normal range of motion.   Neurological:      Comments: Pt sleeping. Opens eyes to touch but drifts back to sleep.          Review of  Symptoms    Symptom Assessment (ESAS 0-10 Scale)  Pain:  0  Dyspnea:  0  Anxiety:  0  Nausea:  0  Depression:  0  Anorexia:  0  Fatigue:  0  Insomnia:  0  Restlessness:  0  Agitation:  0         Comments:  Unable to do ROS due to pt's mental status.           ECOG Performance Status Grade:  4 - Completely disabled    Psychosocial/Cultural: Pt's  is . He  of lung cancer. Pt has two daughters and one son.       Advance Care Planning   Advance Directives:   Living Will: No    LaPOST: No    Medical Power of : No    Agent's Name:  Pt's adult children are next of kin.     Decision Making:  Family answered questions         Significant Labs: All pertinent labs within the past 24 hours have been reviewed.  CBC:   Recent Labs   Lab 10/12/20  0657   WBC 55.55*   HGB 11.3*   HCT 37.1   MCV 95        BMP:  Recent Labs   Lab 10/12/20  0657   *      K 4.5      CO2 22*   BUN 33*   CREATININE 0.6   CALCIUM 7.0*   MG 2.8*     LFT:  Lab Results   Component Value Date    AST 20 10/12/2020    ALKPHOS 169 (H) 10/12/2020    BILITOT 0.6 10/12/2020     Albumin:   Albumin   Date Value Ref Range Status   10/12/2020 1.8 (L) 3.5 - 5.2 g/dL Final     Protein:   Total Protein   Date Value Ref Range Status   10/12/2020 5.5 (L) 6.0 - 8.4 g/dL Final     Lactic acid:   Lab Results   Component Value Date    LACTATE 3.3 (H) 10/11/2020    LACTATE 3.1 (H) 10/08/2020       Significant Imaging: I have reviewed all pertinent imaging results/findings within the past 24 hours.

## 2020-10-12 NOTE — ASSESSMENT & PLAN NOTE
WBC 61 on arrival, peaked at 75    Plan:   - down to 55 today  - Trending  - Consider hematology consult if no improvement

## 2020-10-12 NOTE — SUBJECTIVE & OBJECTIVE
Interval History: No acute changes  Remains on BIPAP   CT 500cc output     Medications:  Continuous Infusions:  Scheduled Meds:   ascorbic acid (vitamin C)  500 mg Oral BID    ceFEPime (MAXIPIME) IVPB  2 g Intravenous Q12H    dornase madeleine (PULMOZYME) 5 mg in sterile water 30 mL  5 mg Chest Tube Once    enoxaparin  40 mg Subcutaneous Q24H    multivitamin  1 tablet Oral Daily    potassium phosphate IVPB  20 mmol Intravenous Once    vancomycin (VANCOCIN) IVPB  15 mg/kg (Order-Specific) Intravenous Q12H     PRN Meds:albuterol-ipratropium, dextrose 50%, dextrose 50%, glucagon (human recombinant), glucose, glucose, lorazepam, sodium chloride 0.9%, Pharmacy to dose Vancomycin consult **AND** vancomycin - pharmacy to dose     Review of patient's allergies indicates:   Allergen Reactions    Penicillins      Tolerates cefepime    Sulfa (sulfonamide antibiotics)      Objective:     Vital Signs (Most Recent):  Temp: 98.3 °F (36.8 °C) (10/12/20 0400)  Pulse: 104 (10/12/20 0740)  Resp: (!) 30 (10/12/20 0624)  BP: 134/60 (10/12/20 0400)  SpO2: (!) 85 % (10/12/20 0740) Vital Signs (24h Range):  Temp:  [98.2 °F (36.8 °C)-98.3 °F (36.8 °C)] 98.3 °F (36.8 °C)  Pulse:  [] 104  Resp:  [27-42] 30  SpO2:  [85 %-100 %] 85 %  BP: (134-138)/(56-63) 134/60     Intake/Output - Last 3 Shifts       10/10 0700 - 10/11 0659 10/11 0700 - 10/12 0659 10/12 0700 - 10/13 0659    P.O. 0 0     Total Intake(mL/kg) 0 (0) 0 (0)     Urine (mL/kg/hr) 1200 (0.8) 800 (0.5)     Other 0      Stool 0      Chest Tube 180 520     Total Output 1380 1320     Net -1380 -1320            Stool Occurrence 1 x 0 x           SpO2: (!) 85 %  O2 Device (Oxygen Therapy): BiPAP    Physical Exam  Vitals signs and nursing note reviewed.   Constitutional:       Appearance: Normal appearance.   HENT:      Head: Normocephalic.   Eyes:      Extraocular Movements: Extraocular movements intact.   Neck:      Musculoskeletal: Normal range of motion.   Cardiovascular:       Rate and Rhythm: Normal rate and regular rhythm.   Pulmonary:      Effort: No respiratory distress.      Breath sounds: No stridor.      Comments: Right chest tube in place with dark SSoutput  Abdominal:      General: Abdomen is flat.      Palpations: Abdomen is soft.   Skin:     General: Skin is warm.   Neurological:      Mental Status: She is alert.         Significant Labs:  ABGs: No results for input(s): PH, PCO2, PO2, HCO3, POCSATURATED, BE in the last 48 hours.  Amylase: No results for input(s): AMYLASE in the last 48 hours.  BMP:   Recent Labs   Lab 10/12/20  0657   *      K 4.5      CO2 22*   BUN 33*   CREATININE 0.6   CALCIUM 7.0*   MG 2.8*     Cardiac markers: No results for input(s): CKMB, CPKMB, TROPONINT, TROPONINI, MYOGLOBIN in the last 48 hours.  CBC:   Recent Labs   Lab 10/11/20  0230   WBC 68.75*   RBC 3.93*   HGB 11.1*   HCT 36.0*      MCV 92   MCH 28.2   MCHC 30.8*       Significant Diagnostics:  I have reviewed all pertinent imaging results/findings within the past 24 hours.    VTE Risk Mitigation (From admission, onward)         Ordered     enoxaparin injection 40 mg  Every 24 hours      10/08/20 0930     IP VTE HIGH RISK PATIENT  Once      10/08/20 0930     Place sequential compression device  Until discontinued      10/08/20 0627

## 2020-10-12 NOTE — PHYSICIAN QUERY
PT Name: Kami Barbosa  MR #: [id number]     ELECTROLYTE CLARIFICATION      CDS: Luz Marina Benavides RN, CCDS         Contact information :ext [phone] [email or web address]     This form is a permanent document in the medical record.    Query Date: October 12, 2020    By submitting this query, we are merely seeking further clarification of documentation to reflect the severity of illness of your patient. Please utilize your independent clinical judgment when addressing the question(s) below.    The Medical Record reflects the following:     Indicators   Supporting Clinical Findings Location in Medical Record   x Lab Value(s) Calcium 6.8 Lab 10/8/20   x Treatment/Medication calcium gluconate 1g in dextrose 5% 100mL    MAR 10/8/20    Other       Provider, please specify the diagnosis or diagnoses that correspond(s) to the above indicators. Vinod all that apply:    [ XXX  ] Hypocalcemia   [   ] Other electrolyte disturbance (please specify): __________   [   ]  Clinically Undetermined       Please document in your progress notes daily for the duration of treatment until resolved, and include in your discharge summary.
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
## 2020-10-12 NOTE — ASSESSMENT & PLAN NOTE
Patient previously diagnosed with breast cancer 2 years prior.  Suggestive pleural effusions likely secondary to underlying malignancy.  Cytology studies have not resulted from outside facility.     Plan:  - Thoracic surgery following, appreciate recs  - Thoracic sx managing the chest tube at this time

## 2020-10-12 NOTE — TREATMENT PLAN
Spoke with patient's daughter, Lorraine, at bedside. Discussed patient's plan of care, goals of treatment. Daughter confirmed patient's desire to not be intubated were she to fail non-invasive ventilation, as discussed with both palliative care and critical care teams.  After further discussion with daughter, decision to make patient full DNR but to otherwise continue aggressive treatment for now. All questions answered, daughter in agreement with treatment plan.     Sven Contreras MD  Internal Medicine, PGY3

## 2020-10-12 NOTE — PT/OT/SLP PROGRESS
Speech Language Pathology      Kami Barbosa  MRN: [id number]    Patient not seen today secondary to (pt on BiPap and not appropriate for PO trials at this time. Will re-attempt on 10/13.).     TOMAS Ortega, CCC-SLP   TOMAS Ortega, CCC-SLP  Speech Language Pathologist  [phone]  10/12/2020

## 2020-10-12 NOTE — ASSESSMENT & PLAN NOTE
Suspected COVID-19 Virus Infection/ Viral Pneumonia due to COVID-19  - COVID-19 testing: pos at outside facility  - Isolation: Airborne/Droplet. Surgical mask on patient. Notify Infection Control  - Diagnostics: Trend Q48hrs if stable, more frequently if patient decompensating       - Management: Per [facility] COVID Treatment Protocol (4/15/20)    - Monitoring:   - Telemetry & Continuous Pulse Oximetry    - Nutrition:    - Multivitamin PO daily   - Add Boost supplement   - Vitamin D 1000IU daily if deficient   - Ascorbic acid 500mg PO bid    - Supportive Care:   - acetaminophen 650mg PO Q6hr PRN fever/headache   - loperamide PRN viral diarrhea   - IVF if indicated, restrictive strategy preferred, no maintenance IV if able   - VTE PPx: enoxaparin or heparin SQ unless contraindicated    - Antibiotics:  - if indications, CXR findings, elevated procal. See protocol for alternatives.   - Discontinue early if low concern for bacterial co-infection   - ceftriaxone 1g Q24h x 5 days      - Investigational Therapies:   - If patient meets criteria   - patient previously completed course of Remdesivir     Acute Hypoxemic Respiratory Failure  - Order RT consult via Respiratory Communication for COVID Protocols  - Order Incentive Spirometer Q4h  - Order Flutter Valve Q4h  - Continuous Pulse Oximetry  - Goal SpO2 92-96%  - Supplemental O2 via LFNC, VentiMask, or HFNC (see Respiratory Support Oxygen Therapies)  - If wheezing, albuterol INH Q6h scheduled & PRN  - Proning Protocol if patient is a candidate (see  Proning Protocol)   - GCS >13, able to self-prone  - If deterioration, may warrant trial of NIPPV and transfer to [facility] pressure room or immediate ICU consult  - Completed remdesivir and dexamethasone course    - 10/8 started vanc + cefipime for HAP coverage    - 10/9 Patient with increasing Oxygen requirements from 30L -> 45L 100%  - BiPAP started 10/9, slowly weaning down FiO2 as tolerated  - MICU evaluated patient during  this time and agreed she is stable and are aware of her  - Patient is DNI, see palliative encounter for more details  - trial of low-dose morphine for dyspnea

## 2020-10-12 NOTE — PROGRESS NOTES
[facility] - ICU 15 [facility] Medicine  Progress Note    Patient Name: Kami Barbosa  MRN: [id number]  Patient Class: IP- Inpatient   Admission Date: 10/8/2020  Length of Stay: 4 days  Attending Physician: Max Otero MD  Primary Care Provider: Primary Doctor No        Subjective:     Principal Problem:Pneumonia due to COVID-19 virus        HPI:  Ms. Barbosa is a 79-year-old female with past medical history of breast cancer who presented to [facility] on 09/26 with right shoulder pain and shortness of breath x 2 days. She was admitted for COVID pneumonia.  She has since completed a 10 day course of steroids, Remdesivir, and azithromycin.  She has required high-flow oxygen, at 30 L with 50% FiO2 to maintain adequate oxygenation. Hospital course was complicated by right pleural effusion requiring chest tube placement.  Cytology results are not available from pleural cultures.  After resolution of pleural effusion, chest tube was removed. However, shortly thereafter, the patient developed pneumothorax prompting placement of a new chest tube.  Medical history limited by lack of records from outside facility.  She was transferred to [facility] for further management due to evacuation protocol.  Upon my assessment, she appears chronically ill and does not appear in acute distress.  She is conversant, aAAO x3 and has no complaints at this time.  She is satting 95% on 40 L high-flow.  Right chest tube currently in place, site is clean, dry and intact.             Overview/Hospital Course:  With moderate respiratory difficulty but with increasing oxygen requirements. 10/9: 30L --> 45L 100% O2. Patient then switched to bipap with some improvement. Evaluated by critical care, who discussed with patient and family and decision was made to continue bipap and make patient DNI. Palliative care following patient and coordinated visitation by children to see patient. Patient remains  on BiPAP.    Interval History: Seen and examined this morning. No acute events overnight. Patient has maintained sats > 90% on BiPAP overnight. Has bneen on BiPAP for a few days, but FiO2 requirements have decreased from 85 -> 75> 55 today. Patient's daughter at bedside. All questions and concerns addressed with patient and family at bedside.    Review of Systems   Constitutional: Negative for fever.   HENT: Negative for congestion and rhinorrhea.    Eyes: Negative for visual disturbance.   Respiratory: Positive for shortness of breath.    Cardiovascular: Negative for chest pain.   Gastrointestinal: Negative for abdominal pain, nausea and vomiting.   Genitourinary: Negative for difficulty urinating.   Musculoskeletal: Negative for arthralgias.   Skin: Negative for color change.   Neurological: Negative for dizziness and light-headedness.   Psychiatric/Behavioral: The patient is nervous/anxious.      Objective:     Vital Signs (Most Recent):  Temp: 98.3 °F (36.8 °C) (10/12/20 0400)  Pulse: 104 (10/12/20 0740)  Resp: (!) 30 (10/12/20 0624)  BP: 134/60 (10/12/20 0400)  SpO2: 95 % (10/12/20 0900) Vital Signs (24h Range):  Temp:  [98.2 °F (36.8 °C)-98.3 °F (36.8 °C)] 98.3 °F (36.8 °C)  Pulse:  [] 104  Resp:  [27-42] 30  SpO2:  [85 %-100 %] 95 %  BP: (134-138)/(56-63) 134/60     Weight: 60.8 kg (134 lb)  Body mass index is 23 kg/m².    Intake/Output Summary (Last 24 hours) at 10/12/2020 1010  Last data filed at 10/12/2020 0650  Gross per 24 hour   Intake 0 ml   Output 1320 ml   Net -1320 ml      Physical Exam  Vitals signs and nursing note reviewed.   Constitutional:       General: She is in acute distress.      Appearance: She is ill-appearing.   HENT:      Head: Normocephalic and atraumatic.      Nose: Nose normal.   Eyes:      Extraocular Movements: Extraocular movements intact.      Conjunctiva/sclera: Conjunctivae normal.      Pupils: Pupils are equal, round, and reactive to light.   Neck:      Musculoskeletal:  Normal range of motion.   Cardiovascular:      Rate and Rhythm: Normal rate and regular rhythm.      Heart sounds: Normal heart sounds. No murmur. No gallop.    Pulmonary:      Effort: Respiratory distress present.      Breath sounds: No wheezing or rales.   Abdominal:      General: Abdomen is flat. There is no distension.      Palpations: Abdomen is soft.      Tenderness: There is no abdominal tenderness. There is no guarding.   Musculoskeletal: Normal range of motion.   Skin:     General: Skin is warm.   Neurological:      General: No focal deficit present.      Mental Status: She is oriented to person, place, and time.         Significant Labs:   ABGs: No results for input(s): PH, PCO2, HCO3, POCSATURATED, BE, TOTALHB, COHB, METHB, O2HB, POCFIO2 in the last 48 hours.  CBC:   Recent Labs   Lab 10/11/20  0230 10/12/20  0657   WBC 68.75* 55.55*   HGB 11.1* 11.3*   HCT 36.0* 37.1    207     CMP:   Recent Labs   Lab 10/11/20  0230 10/12/20  0657   * 145   K 3.6 4.5    110   CO2 22* 22*   GLU 77 114*   BUN 34* 33*   CREATININE 0.7 0.6   CALCIUM 7.2* 7.0*   PROT 5.7* 5.5*   ALBUMIN 2.0* 1.8*   BILITOT 0.6 0.6   ALKPHOS 216* 169*   AST 25 20   ALT 16 13   ANIONGAP 18* 13   EGFRNONAA >60.0 >60.0     Respiratory Culture: No results for input(s): GSRESP, RESPIRATORYC in the last 48 hours.  All pertinent labs within the past 24 hours have been reviewed.    Significant Imaging: I have reviewed all pertinent imaging results/findings within the past 24 hours.      Assessment/Plan:      * Pneumonia due to COVID-19 virus  Suspected COVID-19 Virus Infection/ Viral Pneumonia due to COVID-19  - COVID-19 testing: pos at outside facility  - Isolation: Airborne/Droplet. Surgical mask on patient. Notify Infection Control  - Diagnostics: Trend Q48hrs if stable, more frequently if patient decompensating       - Management: [facility] (4/15/20)    - Monitoring:   - Telemetry & Continuous Pulse  Oximetry    - Nutrition:    - Multivitamin PO daily   - Add Boost supplement   - Vitamin D 1000IU daily if deficient   - Ascorbic acid 500mg PO bid    - Supportive Care:   - acetaminophen 650mg PO Q6hr PRN fever/headache   - loperamide PRN viral diarrhea   - IVF if indicated, restrictive strategy preferred, no maintenance IV if able   - VTE PPx: enoxaparin or heparin SQ unless contraindicated    - Antibiotics:  - if indications, CXR findings, elevated procal. See protocol for alternatives.   - Discontinue early if low concern for bacterial co-infection   - ceftriaxone 1g Q24h x 5 days      - Investigational Therapies:   - If patient meets criteria   - patient previously completed course of Remdesivir     Acute Hypoxemic Respiratory Failure  - Order RT consult via Respiratory Communication for COVID Protocols  - Order Incentive Spirometer Q4h  - Order Flutter Valve Q4h  - Continuous Pulse Oximetry  - Goal SpO2 92-96%  - Supplemental O2 via LFNC, VentiMask, or HFNC (see Respiratory Support Oxygen Therapies)  - If wheezing, albuterol INH Q6h scheduled & PRN  - Proning Protocol if patient is a candidate (see  Proning Protocol)   - GCS >13, able to self-prone  - If deterioration, may warrant trial of NIPPV and transfer to [facility] pressure room or immediate ICU consult  - Completed remdesivir and dexamethasone course    - 10/8 started vanc + cefipime for HAP coverage    - 10/9 Patient with increasing Oxygen requirements from 30L -> 45L 100%  - BiPAP started 10/9, slowly weaning down FiO2 as tolerated  - MICU evaluated patient during this time and agreed she is stable and are aware of her  - Patient is DNI, see palliative encounter for more details  - trial of low-dose morphine for dyspnea      Leukocytosis  WBC 61 on arrival, peaked at 75    Plan:   - down to 55 today  - Trending  - Consider hematology consult if no improvement      Postprocedural pneumothorax  Resolved    Anxiety  Patient c/o anxiety    Plan:  - 0.5 IV  ativan q6h PRN  - Use with caution    Advance care planning  - see palliative care encounter    Palliative care encounter  Patient states she would like to be DNI  -- Spoke to daughter and updated on medical condition   -- Palliative care consulted and met with patient and spoke to patient's daughter  -- Appreciate assistance  -- Patient's   a few years ago from lung cancer  -- Daughter and son now in town alternating visits    Pleural effusion  Patient previously diagnosed with breast cancer 2 years prior.  Suggestive pleural effusions likely secondary to underlying malignancy.  Cytology studies have not resulted from outside facility.     Plan:  - Thoracic surgery following, appreciate recs  - Thoracic sx managing the chest tube at this time      VTE Risk Mitigation (From admission, onward)         Ordered     enoxaparin injection 40 mg  Every 24 hours      10/08/20 0930     IP VTE HIGH RISK PATIENT  Once      10/08/20 09     Place sequential compression device  Until discontinued      10/08/20 06                Discharge Planning   CHRIS: 10/16/2020     Code Status: Partial Code   Is the patient medically ready for discharge?: No    Reason for patient still in hospital (select all that apply): Patient unstable  Discharge Plan A: Home   Discharge Delays: None known at this time      Jayden Vega MD, PGY-1  Department of Hospital Medicine   [facility] - ICU 15 [facility]

## 2020-10-12 NOTE — PLAN OF CARE
10/12/20 1525   Post-Acute Status   Post-Acute Authorization Other   Other Status See Comments     SW following patient dc planning needs, Post acute needs to be determined. SW will continue to follow up.      Jade Fox LMSW  [facility]   [id number]

## 2020-10-12 NOTE — NURSING
Adm Ativan 0.5 mg IVP per c/o increased agitation AEB moaning, crying and pulling at mask. Daughter at bedside.

## 2020-10-13 PROBLEM — E83.39 HYPOPHOSPHATEMIA: Status: ACTIVE | Noted: 2020-01-01

## 2020-10-13 NOTE — ASSESSMENT & PLAN NOTE
Patient's phos since 10/11 has been low. Most recently critically low at 1.0    Plan:  - replacement with 30 mmol NaPhosphate IVPB  - will follow up with P this afternoon and replete PRN  - Mg, K WNL

## 2020-10-13 NOTE — ASSESSMENT & PLAN NOTE
Patient tested positive for COVID-19 virus at OSH, likely the cause of her acute hypoxemic respiratory failure    Plan:  - continuous pulse ox, supportive O2 therapy with RT consult, intermittent blood gases  - has previously completed remdesivir and dexamethasone course  - Intermittent CXR, currently on HAP coverage with vanc and cefepime (started 10/8)  - 10/9- increasing O2 requirements, started on BiPAP  - 10/11- on and off BiPAP vs CF  - 10/12- CF 40L @ 40%  - MICU is aware of patient  - currently DNR/DNI, see palliative note

## 2020-10-13 NOTE — PLAN OF CARE
Problem: SLP Goal  Goal: SLP Goal  Description: Speech Language Pathology Goals  Goals expected to be met by 10/20:  1. Pt will participate in ongoing swallowing assessment to determine if safe for PO intake.   Outcome: Ongoing, Progressing     Bedside swallow study completed.  Pt not safe for PO intake, including PO meds. STRICT NPO recommended.  Ongoing swallowing assessment to be conducted.   TOMAS Ortega, CCC-SLP  Speech Language Pathologist  [phone]  10/13/2020

## 2020-10-13 NOTE — NURSING
Speech therapy has been in to see patient, patient to remain NPO at present. Speech therapy will continue to follow.

## 2020-10-13 NOTE — ASSESSMENT & PLAN NOTE
Hx of breast cancer 2 years prior. Recurrent pleural effusions thought 2/2 to malignancy. Chest tube initially placed at OSH, removed, but complicated by pneumothorax and replaced. Managed by thoracic sx since her stay here.    Plan:  - Thoracic surgery following, appreciate recs  - Thoracic sx managing the chest tube at this time - likely to pull it today
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
## 2020-10-13 NOTE — SUBJECTIVE & OBJECTIVE
Interval History: Seen and examined this morning. No acute events overnight. Patient did not tolerate the BiPAP overnight, became irritable. RT switched patient to CF, with stable saturations at 40L @ 40%. Patient tolerated it better. Still lethargic but arousable. Still tachypneic. Unlikely patient would be candidate for PO intake. Plan to deescalate CF with extreme caution as tolerated. Remains DNI. Family to bedside today.     Review of Systems   Constitutional: Negative for fever.   HENT: Negative for congestion and rhinorrhea.    Eyes: Negative for visual disturbance.   Respiratory: Positive for shortness of breath.    Cardiovascular: Negative for chest pain.   Gastrointestinal: Negative for abdominal pain, nausea and vomiting.   Genitourinary: Negative for difficulty urinating.   Musculoskeletal: Negative for arthralgias.   Skin: Negative for color change.   Neurological: Negative for dizziness and light-headedness.   Psychiatric/Behavioral: The patient is nervous/anxious.      Objective:     Vital Signs (Most Recent):  Temp: 98 °F (36.7 °C) (10/13/20 0726)  Pulse: 108 (10/13/20 0803)  Resp: (!) 47 (10/13/20 0803)  BP: (!) 148/65 (10/13/20 0726)  SpO2: 97 % (10/13/20 0803) Vital Signs (24h Range):  Temp:  [97.7 °F (36.5 °C)-98.2 °F (36.8 °C)] 98 °F (36.7 °C)  Pulse:  [101-115] 108  Resp:  [26-47] 47  SpO2:  [87 %-100 %] 97 %  BP: (134-154)/(56-67) 148/65     Weight: 60.8 kg (134 lb)  Body mass index is 23 kg/m².    Intake/Output Summary (Last 24 hours) at 10/13/2020 1017  Last data filed at 10/13/2020 0647  Gross per 24 hour   Intake --   Output 655 ml   Net -655 ml      Physical Exam  Vitals signs and nursing note reviewed.   Constitutional:       General: She is in acute distress.      Appearance: She is ill-appearing.   HENT:      Head: Normocephalic and atraumatic.      Nose: Nose normal.   Eyes:      Extraocular Movements: Extraocular movements intact.      Conjunctiva/sclera: Conjunctivae normal.       Pupils: Pupils are equal, round, and reactive to light.   Neck:      Musculoskeletal: Normal range of motion.   Cardiovascular:      Rate and Rhythm: Normal rate and regular rhythm.      Heart sounds: Normal heart sounds. No murmur. No gallop.    Pulmonary:      Effort: Respiratory distress present.      Breath sounds: No wheezing or rales.   Abdominal:      General: Abdomen is flat. There is no distension.      Palpations: Abdomen is soft.      Tenderness: There is no abdominal tenderness. There is no guarding.   Musculoskeletal: Normal range of motion.   Skin:     General: Skin is warm.   Neurological:      General: No focal deficit present.      Mental Status: She is oriented to person, place, and time.         Significant Labs:   ABGs:   Recent Labs   Lab 10/12/20  1515   PH 7.392   PCO2 46.4*   HCO3 28.2*   POCSATURATED 39*   BE 3     CBC:   Recent Labs   Lab 10/12/20  0657 10/13/20  0456   WBC 55.55* 51.35*   HGB 11.3* 11.0*   HCT 37.1 34.7*    202     CMP:   Recent Labs   Lab 10/12/20  0657 10/13/20  0456    141   K 4.5 4.6    108   CO2 22* 22*   * 127*   BUN 33* 25*   CREATININE 0.6 0.5   CALCIUM 7.0* 7.1*   PROT 5.5* 5.5*   ALBUMIN 1.8* 1.8*   BILITOT 0.6 0.6   ALKPHOS 169* 153*   AST 20 20   ALT 13 13   ANIONGAP 13 11   EGFRNONAA >60.0 >60.0     All pertinent labs within the past 24 hours have been reviewed.    Significant Imaging: I have reviewed all pertinent imaging results/findings within the past 24 hours.

## 2020-10-13 NOTE — ASSESSMENT & PLAN NOTE
WBC 61 on arrival, peaked at 75    Plan:   - down to 51 today  - Trending  - Consider hematology consult if no improvement

## 2020-10-13 NOTE — PLAN OF CARE
Currently not stable for discharge - Remains on 40L and 90% FIO2 Comfort Flow. Right chest tube remains with serosanguinous drainage. Unable to obtain RECs at this time due to severity of illness. Will continue to follow.    Emilie Ansari RN  Case Management  Ext [phone]       10/13/20 8566   Discharge Reassessment   Assessment Type Discharge Planning Reassessment   Provided patient/caregiver education on the expected discharge date and the discharge plan Yes   Do you have any problems affording any of your prescribed medications? No   Discharge Plan A Other  (TBD - not appropriate to obtaine RECs at this time)   DME Needed Upon Discharge  other (see comments)  (TBD)   Post-Acute Status   Post-Acute Placement Status Awaiting Internal Medical Clearance   Discharge Delays None known at this time

## 2020-10-13 NOTE — NURSING
Administered Ativan per order, pt agitated and restless, resp even and unlabored, pt on bi-pap and unable to tolerate and changed to comfort flow, pt has R chest tube in place to water seal, minimum output, will cont to monitor throughout shift

## 2020-10-13 NOTE — PROGRESS NOTES
[facility] - ICU [facility] Medicine  Progress Note    Patient Name: Kami Barbosa  MRN: [id number]  Patient Class: IP- Inpatient   Admission Date: 10/8/2020  Length of Stay: 5 days  Attending Physician: Max Otero MD  Primary Care Provider: Primary Doctor No        Subjective:     Principal Problem:Pneumonia due to COVID-19 virus        HPI:  Ms. Barbosa is a 79-year-old female with past medical history of breast cancer who presented to [facility] on 09/26 with right shoulder pain and shortness of breath x 2 days. She was admitted for COVID pneumonia.  She has since completed a 10 day course of steroids, Remdesivir, and azithromycin.  She has required high-flow oxygen, at 30 L with 50% FiO2 to maintain adequate oxygenation. Hospital course was complicated by right pleural effusion requiring chest tube placement.  Cytology results are not available from pleural cultures.  After resolution of pleural effusion, chest tube was removed. However, shortly thereafter, the patient developed pneumothorax prompting placement of a new chest tube.  Medical history limited by lack of records from outside facility.  She was transferred to [facility] for further management due to evacuation protocol.  Upon my assessment, she appears chronically ill and does not appear in acute distress.  She is conversant, aAAO x3 and has no complaints at this time.  She is satting 95% on 40 L high-flow.  Right chest tube currently in place, site is clean, dry and intact.             Overview/Hospital Course:  With moderate respiratory difficulty but with increasing oxygen requirements. 10/9: 30L --> 45L 100% O2. Patient then switched to bipap with some improvement. Evaluated by critical care, who discussed with patient and family and decision was made to continue bipap and make patient DNI. Palliative care following patient and coordinated visitation by children to see patient. Patient remains  on BiPAP.    Interval History: Seen and examined this morning. No acute events overnight. Patient did not tolerate the BiPAP overnight, became irritable. RT switched patient to CF, with stable saturations at 40L @ 40%. Patient tolerated it better. Still lethargic but arousable. Still tachypneic. Unlikely patient would be candidate for PO intake. Plan to deescalate CF with extreme caution as tolerated. Remains DNI. Family to bedside today.     Review of Systems   Constitutional: Negative for fever.   HENT: Negative for congestion and rhinorrhea.    Eyes: Negative for visual disturbance.   Respiratory: Positive for shortness of breath.    Cardiovascular: Negative for chest pain.   Gastrointestinal: Negative for abdominal pain, nausea and vomiting.   Genitourinary: Negative for difficulty urinating.   Musculoskeletal: Negative for arthralgias.   Skin: Negative for color change.   Neurological: Negative for dizziness and light-headedness.   Psychiatric/Behavioral: The patient is nervous/anxious.      Objective:     Vital Signs (Most Recent):  Temp: 98 °F (36.7 °C) (10/13/20 0726)  Pulse: 108 (10/13/20 0803)  Resp: (!) 47 (10/13/20 0803)  BP: (!) 148/65 (10/13/20 0726)  SpO2: 97 % (10/13/20 0803) Vital Signs (24h Range):  Temp:  [97.7 °F (36.5 °C)-98.2 °F (36.8 °C)] 98 °F (36.7 °C)  Pulse:  [101-115] 108  Resp:  [26-47] 47  SpO2:  [87 %-100 %] 97 %  BP: (134-154)/(56-67) 148/65     Weight: 60.8 kg (134 lb)  Body mass index is 23 kg/m².    Intake/Output Summary (Last 24 hours) at 10/13/2020 1017  Last data filed at 10/13/2020 0647  Gross per 24 hour   Intake --   Output 655 ml   Net -655 ml      Physical Exam  Vitals signs and nursing note reviewed.   Constitutional:       General: She is in acute distress.      Appearance: She is ill-appearing.   HENT:      Head: Normocephalic and atraumatic.      Nose: Nose normal.   Eyes:      Extraocular Movements: Extraocular movements intact.      Conjunctiva/sclera: Conjunctivae  normal.      Pupils: Pupils are equal, round, and reactive to light.   Neck:      Musculoskeletal: Normal range of motion.   Cardiovascular:      Rate and Rhythm: Normal rate and regular rhythm.      Heart sounds: Normal heart sounds. No murmur. No gallop.    Pulmonary:      Effort: Respiratory distress present.      Breath sounds: No wheezing or rales.   Abdominal:      General: Abdomen is flat. There is no distension.      Palpations: Abdomen is soft.      Tenderness: There is no abdominal tenderness. There is no guarding.   Musculoskeletal: Normal range of motion.   Skin:     General: Skin is warm.   Neurological:      General: No focal deficit present.      Mental Status: She is oriented to person, place, and time.         Significant Labs:   ABGs:   Recent Labs   Lab 10/12/20  1515   PH 7.392   PCO2 46.4*   HCO3 28.2*   POCSATURATED 39*   BE 3     CBC:   Recent Labs   Lab 10/12/20  0657 10/13/20  0456   WBC 55.55* 51.35*   HGB 11.3* 11.0*   HCT 37.1 34.7*    202     CMP:   Recent Labs   Lab 10/12/20  0657 10/13/20  0456    141   K 4.5 4.6    108   CO2 22* 22*   * 127*   BUN 33* 25*   CREATININE 0.6 0.5   CALCIUM 7.0* 7.1*   PROT 5.5* 5.5*   ALBUMIN 1.8* 1.8*   BILITOT 0.6 0.6   ALKPHOS 169* 153*   AST 20 20   ALT 13 13   ANIONGAP 13 11   EGFRNONAA >60.0 >60.0     All pertinent labs within the past 24 hours have been reviewed.    Significant Imaging: I have reviewed all pertinent imaging results/findings within the past 24 hours.      Assessment/Plan:      * Pneumonia due to COVID-19 virus  Patient tested positive for COVID-19 virus at OSH, likely the cause of her acute hypoxemic respiratory failure    Plan:  - continuous pulse ox, supportive O2 therapy with RT consult, intermittent blood gases  - has previously completed remdesivir and dexamethasone course  - Intermittent CXR, currently on HAP coverage with vanc and cefepime (started 10/8)  - 10/9- increasing O2 requirements, started  on BiPAP  - 10/11- on and off BiPAP vs CF  - 10/12- CF 40L @ 40%  - MICU is aware of patient  - currently DNR/DNI, see palliative note    Hypophosphatemia  Patient's phos since 10/11 has been low. Most recently critically low at 1.0    Plan:  - replacement with 30 mmol NaPhosphate IVPB  - will follow up with P this afternoon and replete PRN  - Mg, K WNL      Leukocytosis  WBC 61 on arrival, peaked at 75    Plan:   - down to 51 today  - Trending  - Consider hematology consult if no improvement      Postprocedural pneumothorax  Resolved    Anxiety  Patient c/o anxiety    Plan:  - 0.5 IV ativan q6h PRN  - Use with caution    Advance care planning  - see palliative care encounter    Palliative care encounter  Patient states she would like to be DNI    Plan:   - palliative care consulted, appreciate assistance  - Spoke to daughter and updated on medical condition   - Palliative care consulted and met with patient and spoke to patient's daughter  - Patient's   a few years ago from lung cancer-- Daughter and son now in town alternating visits    Pleural effusion  Hx of breast cancer 2 years prior. Recurrent pleural effusions thought 2/2 to malignancy. Chest tube initially placed at OSH, removed, but complicated by pneumothorax and replaced. Managed by thoracic sx since her stay here.    Plan:  - Thoracic surgery following, appreciate recs  - Thoracic sx managing the chest tube at this time - likely to pull it today      VTE Risk Mitigation (From admission, onward)         Ordered     enoxaparin injection 40 mg  Every 24 hours      10/08/20 0930     IP VTE HIGH RISK PATIENT  Once      10/08/20 0930     Place sequential compression device  Until discontinued      10/08/20 06                Discharge Planning   CHRIS: 10/16/2020     Code Status: DNR   Is the patient medically ready for discharge?: No    Reason for patient still in hospital (select all that apply): Patient unstable and Treatment  Discharge Plan A:  Home   Discharge Delays: None known at this time    DISPO: Patient not medically stable for discharge at this time.    Jayden Vega MD, PGY-1  Department of Hospital Medicine   [facility] - ICU 15 [facility]

## 2020-10-13 NOTE — SUBJECTIVE & OBJECTIVE
Interval History: Off BIPAP, on comfort flow  CT output 150. No air leak    Medications:  Continuous Infusions:  Scheduled Meds:   ascorbic acid (vitamin C)  500 mg Oral BID    ceFEPime (MAXIPIME) IVPB  2 g Intravenous Q12H    dornase madeleine (PULMOZYME) 5 mg in sterile water 30 mL  5 mg Chest Tube Once    enoxaparin  40 mg Subcutaneous Q24H    multivitamin  1 tablet Oral Daily    sodium phosphate IVPB  30 mmol Intravenous Once    vancomycin (VANCOCIN) IVPB  15 mg/kg (Order-Specific) Intravenous Q12H     PRN Meds:albuterol-ipratropium, dextrose 50%, dextrose 50%, glucagon (human recombinant), glucose, glucose, lorazepam, sodium chloride 0.9%, Pharmacy to dose Vancomycin consult **AND** vancomycin - pharmacy to dose     Review of patient's allergies indicates:   Allergen Reactions    Penicillins      Tolerates cefepime    Sulfa (sulfonamide antibiotics)      Objective:     Vital Signs (Most Recent):  Temp: 98 °F (36.7 °C) (10/13/20 0726)  Pulse: 110 (10/13/20 0726)  Resp: (!) 30 (10/13/20 0726)  BP: (!) 148/65 (10/13/20 0726)  SpO2: 96 % (10/13/20 0726) Vital Signs (24h Range):  Temp:  [97.7 °F (36.5 °C)-98.2 °F (36.8 °C)] 98 °F (36.7 °C)  Pulse:  [101-115] 110  Resp:  [26-44] 30  SpO2:  [87 %-100 %] 96 %  BP: (134-154)/(56-74) 148/65     Intake/Output - Last 3 Shifts       10/11 0700 - 10/12 0659 10/12 0700 - 10/13 0659 10/13 0700 - 10/14 0659    P.O. 0      Total Intake(mL/kg) 0 (0)      Urine (mL/kg/hr) 800 (0.5) 500 (0.3)     Other       Stool       Chest Tube 520 155     Total Output 1320 655     Net -1320 -655            Urine Occurrence  3 x     Stool Occurrence 0 x            SpO2: 96 %  O2 Device (Oxygen Therapy): Comfort Flow    Physical Exam  Vitals signs and nursing note reviewed.   Constitutional:       Appearance: Normal appearance.   HENT:      Head: Normocephalic.   Eyes:      Extraocular Movements: Extraocular movements intact.   Neck:      Musculoskeletal: Normal range of motion.    Cardiovascular:      Rate and Rhythm: Normal rate and regular rhythm.   Pulmonary:      Effort: No respiratory distress.      Breath sounds: No stridor.      Comments: Right chest tube in place with dark SSoutput  Abdominal:      General: Abdomen is flat.      Palpations: Abdomen is soft.   Skin:     General: Skin is warm.   Neurological:      Mental Status: She is alert.         Significant Labs:  ABGs:   Recent Labs   Lab 10/12/20  1515   PH 7.392   PCO2 46.4*   PO2 23*   HCO3 28.2*   POCSATURATED 39*   BE 3     Amylase: No results for input(s): AMYLASE in the last 48 hours.  BMP:   Recent Labs   Lab 10/13/20  0456   *      K 4.6      CO2 22*   BUN 25*   CREATININE 0.5   CALCIUM 7.1*   MG 2.9*     Cardiac markers: No results for input(s): CKMB, CPKMB, TROPONINT, TROPONINI, MYOGLOBIN in the last 48 hours.  CBC:   Recent Labs   Lab 10/13/20  0456   WBC 51.35*   RBC 3.81*   HGB 11.0*   HCT 34.7*      MCV 91   MCH 28.9   MCHC 31.7*     CMP:   Recent Labs   Lab 10/13/20  0456   *   CALCIUM 7.1*   ALBUMIN 1.8*   PROT 5.5*      K 4.6   CO2 22*      BUN 25*   CREATININE 0.5   ALKPHOS 153*   ALT 13   AST 20   BILITOT 0.6       Significant Diagnostics:  I have reviewed all pertinent imaging results/findings within the past 24 hours.    VTE Risk Mitigation (From admission, onward)         Ordered     enoxaparin injection 40 mg  Every 24 hours      10/08/20 0930     IP VTE HIGH RISK PATIENT  Once      10/08/20 0930     Place sequential compression device  Until discontinued      10/08/20 0602

## 2020-10-13 NOTE — ASSESSMENT & PLAN NOTE
79 y.o. male admitted for COVID pneumonia complicated by right parapneumonic pleural effusion s/p chest tube placement 9/29 and replacement on 10/4    S/p lytics 10/9, 10/11   Will remove chest tube today  Rest of care per primary team. Guarded prognosis

## 2020-10-13 NOTE — PT/OT/SLP EVAL
Speech Language Pathology Evaluation  Bedside Swallow    Patient Name:  Kami Barbosa   MRN:  [id number]  Admitting Diagnosis: Pneumonia due to COVID-19 virus    Recommendations:                 General Recommendations:  ongoing swallowing assessment  Diet recommendations:  NPO, NPO   Aspiration Precautions: Alternate means of nutrition/hydration, Frequent oral care and Strict aspiration precautions   General Precautions: Standard, airborne, aspiration, contact, droplet, fall  Communication strategies:  pt with limited ability to communicate at this time    History:     No past medical history on file.    No past surgical history on file.     HPI: Ms. Barbosa is a 79-year-old female with past medical history of breast cancer who presented to [facility] on 09/26 with right shoulder pain and shortness of breath x 2 days. She was admitted for COVID pneumonia.  She has since completed a 10 day course of steroids, Remdesivir, and azithromycin.  She has required high-flow oxygen, at 30 L with 50% FiO2 to maintain adequate oxygenation. Hospital course was complicated by right pleural effusion requiring chest tube placement.  Cytology results are not available from pleural cultures.  After resolution of pleural effusion, chest tube was removed. However, shortly thereafter, the patient developed pneumothorax prompting placement of a new chest tube.  Medical history limited by lack of records from outside facility.  She was transferred to [facility] for further management due to evacuation protocol.  Upon my assessment, she appears chronically ill and does not appear in acute distress.  She is conversant, aAAO x3 and has no complaints at this time.  She is satting 95% on 40 L high-flow.  Right chest tube currently in place, site is clean, dry and intact.      Prior Intubation HX:  None on file    Modified Barium Swallow: none on file    Chest X-Rays:  10/13/20: FINDINGS:  Examination was obtained at  the bedside with the patient in steep kyphotic position.     Port in the right anterior chest wall has attached catheter that extends to overlie the mediastinum with its tip projected over the cephalad aspect of the right atrium.     Right thoracostomy tube present on yesterday's study of 0735 hours has been discontinued.  Small pneumothorax at the right lateral costophrenic angle observed on that earlier study is no longer apparent today.     Patchy heterogeneous pulmonary opacities are present in both lungs with perihilar distribution no better than on yesterday's examination; this includes areas of reticulation and bandlike opacities but possibly new airspace disease as well.  It may be related to the patient's known COVID-19 infection noting that long-term radiographic course of COVID-19 lung disease is not yet well established.     No other significant change compared to yesterday's examination.  Mediastinal structures are midline in this 79-year-old woman with calcific plaque in the thoracic aorta.    Prior diet: most recent diet orders were for regular solids and thin liquids, but pt being kept NPO    Subjective     Pt receiving HFNC.  SPO2 93-95%.  Daughter present at bedside.  Pt with altered mental status, poor BERNADINE, open mouth breathing, repeated moaning, and inability to follow commands or answer yes/no q's.   Ice to lips, ice in oral cavity - no oral manip - oral suctioning - no swallow  rec Strict NPO  daughter present      Pain/Comfort:  · Pain Rating 1: 0/10    Objective:     Oral Musculature Evaluation  · Oral Musculature: unable to assess due to poor participation/comprehension, general weakness  · Mucosal Quality: dry  · Volitional Cough: did not follow commands to perform  · Volitional Swallow: did not follow commands to produce  · Voice Prior to PO Intake: dry vocal quality noted during consistent moaning    Bedside Swallow Eval:   Consistencies Assessed:  · Thin liquids ice chip attempt x 1       Oral Phase:   · Decreased closure around utensil  · Poor oral acceptance  · Pt made no attempts to orally manipulate or initiate A-P transit with ice chip.  Melted ice chip was suctioned from floor or oral cavity    Pharyngeal Phase:   · Unable to assess - not elicited    Compensatory Strategies  · None    Treatment: Education provided to pt and daughter regarding role of SLP, purpose of swallowing assessment, altered mental status impacting ability to safely accept PO, high risks of aspiration, recommendations to remain strictly NPO at this time, and plan for ongoing swallowing assessment.  Pt's daughter demonstrated some understanding, but pt unable to demonstrate understanding. Nurse and medical team notified of assessment and recommendations.     Assessment:     Kami Barbosa is a 79 y.o. female with an SLP diagnosis of Dysphagia associated with altered mental status.     Goals:   Multidisciplinary Problems     SLP Goals        Problem: SLP Goal    Goal Priority Disciplines Outcome   SLP Goal     SLP Ongoing, Progressing   Description: Speech Language Pathology Goals  Goals expected to be met by 10/20:  1. Pt will participate in ongoing swallowing assessment to determine if safe for PO intake.                    Plan:     · Patient to be seen:  5 x/week   · Plan of Care expires:  11/12/20  · Plan of Care reviewed with:  patient, daughter   · SLP Follow-Up:  Yes       Discharge recommendations:  (tbd pending progress)     Time Tracking:     SLP Treatment Date:   10/13/20  Speech Start Time:  0955  Speech Stop Time:  1011     Speech Total Time (min):  16 min    Billable Minutes: Eval Swallow and Oral Function 8 and Seld Care/Home Management Training 8    TOMAS Ortega, CCC-SLP  10/13/2020         TOMAS Ortega, CCC-SLP  Speech Language Pathologist  [phone]  10/13/2020

## 2020-10-13 NOTE — CARE UPDATE
Pt.. doesn't tilerate BiPAP, pulling her mask off constantly and desating. Placed pt. Back on an Comfort Flow (Airvo) at 40L and 50% FiO2. MD notified. Pt. Tolerating it well, will continue to monitor.

## 2020-10-13 NOTE — ASSESSMENT & PLAN NOTE
Patient states she would like to be DNI    Plan:   - palliative care consulted, appreciate assistance  - Spoke to daughter and updated on medical condition   - Palliative care consulted and met with patient and spoke to patient's daughter  - Patient's   a few years ago from lung cancer-- Daughter and son now in town alternating visits

## 2020-10-14 PROBLEM — E46 MALNUTRITION: Status: ACTIVE | Noted: 2020-01-01

## 2020-10-14 NOTE — PROGRESS NOTES
[facility]   Palliative Medicine  Progress Note    Patient Name: Kami Barbosa  MRN: [id number]  Admission Date: 10/8/2020  Hospital Length of Stay: 6 days  Code Status: DNR   Attending Provider: Max Otero MD  Consulting Provider: SENDY Cedeño  Primary Care Physician: Primary Doctor No  Principal Problem:Pneumonia due to COVID-19 virus    Patient information was obtained from patient, relative(s) and ER records.      Assessment/Plan:     Palliative care encounter  Impression: Pt is a 80 y/o female with past medical history of breast cancer who presented to [facility] on  with right shoulder pain and shortness of breath x 2 days. She was admitted for COVID pneumonia.  She has since completed a 10 day course of steroids, Remdesivir, and azithromycin. Pt is on high flow NC. Pt is sleeping. Not able to fully participate in conversation at this time. Pt is a DNI.     Reason for consult: Spoke to Dr. Raya. \[facility] [place] [facility]\"" care cosulted for support/advance care-planning. Pt made DNI today per pt's request.     Today:  Spoke to Dr. Otero concerning plan of care for pt. Continuing aggressive treatment. Pt does not want NG tube for feeds. Possible TPN.      Met with daughter, Lorraine who is at bedside. Pt on comfort flow. Opens eyes to voice and able to communicate somewhat.   Support given to pt's daughter at bedside. Daughters is aware of pt's respiratory issues and that she can potentially decline form respiratory standpoint.     10/9/2020  Rounded on pt. Pt is on Bi-PAP. Pt sleeping. Pt opened eyes to voice but drifts back to sleep. Unable to have a meaningful conversation.   Called and spoke to pt's daughter Lorraine over-the-phone. She is bracing for [place] in [place] area. Daughter is aware pt made DNI today. Per daughter, pt's   of lung cancer a few years ago and they had had the conversation about intubation. Per daughter, she was not surprised pt  wanted to be a DNI. Per daughter, she respects her wishes. Updated daughter on new visiting policy. Per Lorraine, pt's daughter Samantha will most likely visit tomorrow due to being closer to [facility].     [facility] care number given to daughter to call with any questions, concerns.     Support given.    Plan:   Pt is DNI.   continuing aggressive treatment.   Family is aware of pt's medical condition and code status.   Daughter, Lorraine is at bedside today.   Will follow.           I will follow-up with patient. Please contact us if you have any additional questions.    Subjective:     Chief Complaint: No chief complaint on file.      HPI:   Pt is a 79-year-old female with past medical history of breast cancer who presented to [facility] on 09/26 with right shoulder pain and shortness of breath x 2 days. Per chart review, she was admitted for COVID pneumonia.  She has since completed a 10 day course of steroids, Remdesivir, and azithromycin.  She has required high-flow oxygen, at 30 L with 50% FiO2 to maintain adequate oxygenation. Hospital course was complicated by right pleural effusion requiring chest tube placement.  Cytology results are not available from pleural cultures.  After resolution of pleural effusion, chest tube was removed. However, shortly thereafter, the patient developed pneumothorax prompting placement of a new chest tube.  Medical history limited by lack of records from outside facility.  She was transferred to [facility] for further management due to evacuation protocol.  Upon my assessment, she appears chronically ill and does not appear in acute distress. On admit, she is conversant, AAO x3 and has no complaints at this time.  She was satting 95% on 40 L high-flow.  Right chest tube currently in place, site is clean, dry and intact.    Pt is a DNI    Hospital Course:  No notes on file    Interval History: DNI/on Bi-PAP at 100 %    No past medical history on  file.    No past surgical history on file.    Review of patient's allergies indicates:   Allergen Reactions    Penicillins      Tolerates cefepime    Sulfa (sulfonamide antibiotics)        Medications:  Continuous Infusions:   Standard Custom Day One ADULT TPN for patient with NO electrolyte abnormality or NO renal dysfunction (PERIPHERAL)       Scheduled Meds:   ascorbic acid (vitamin C)  500 mg Oral BID    ceFEPime (MAXIPIME) IVPB  2 g Intravenous Q12H    enoxaparin  40 mg Subcutaneous Q24H    fat emulsion 20%  250 mL Intravenous Daily    multivitamin  1 tablet Oral Daily    vancomycin (VANCOCIN) IVPB  15 mg/kg (Order-Specific) Intravenous Q12H     PRN Meds:albuterol-ipratropium, dextrose 50%, dextrose 50%, glucagon (human recombinant), glucose, glucose, lorazepam, sodium chloride 0.9%, Pharmacy to dose Vancomycin consult **AND** vancomycin - pharmacy to dose    Family History     None        Tobacco Use    Smoking status: Not on file   Substance and Sexual Activity    Alcohol use: Not on file    Drug use: Not on file    Sexual activity: Not on file       Review of Systems   Unable to perform ROS: Acuity of condition     Objective:     Vital Signs (Most Recent):  Temp: 98.2 °F (36.8 °C) (10/14/20 0900)  Pulse: 104 (10/14/20 1531)  Resp: (!) 32 (10/14/20 1355)  BP: (!) 167/64 (10/14/20 0900)  SpO2: 96 % (10/14/20 1355) Vital Signs (24h Range):  Temp:  [98.1 °F (36.7 °C)-98.9 °F (37.2 °C)] 98.2 °F (36.8 °C)  Pulse:  [] 104  Resp:  [30-35] 32  SpO2:  [86 %-97 %] 96 %  BP: (160-167)/(64-72) 167/64     Weight: 60.8 kg (134 lb 0.6 oz)  Body mass index is 23.01 kg/m².    Physical Exam  Constitutional:       General: She is sleeping.      Comments: Pt on high flow NC   HENT:      Head: Normocephalic.   Pulmonary:      Comments: On high flow O2  Musculoskeletal: Normal range of motion.   Neurological:      Comments: Pt sleeping. Opens eyes to touch but drifts back to sleep.          Review of  Symptoms    Symptom Assessment (ESAS 0-10 Scale)  Pain:  0  Dyspnea:  0  Anxiety:  0  Nausea:  0  Depression:  0  Anorexia:  0  Fatigue:  0  Insomnia:  0  Restlessness:  0  Agitation:  0         Comments:  Pt reports no pain.           ECOG Performance Status Grade:  4 - Completely disabled    Psychosocial/Cultural: Pt's  is . He  of lung cancer. Pt has two daughters and one son.       Advance Care Planning   Advance Directives:   Living Will: No    LaPOST: No    Medical Power of : No    Agent's Name:  Pt's adult children are next of kin.     Decision Making:  Family answered questions         Significant Labs: All pertinent labs within the past 24 hours have been reviewed.  CBC:   Recent Labs   Lab 10/14/20  0649   WBC 48.98*   HGB 10.7*   HCT 36.9*   MCV 99*        BMP:  Recent Labs   Lab 10/14/20  0649   *      K 4.6      CO2 21*   BUN 29*   CREATININE 0.6   CALCIUM 6.9*   MG 2.8*     LFT:  Lab Results   Component Value Date    AST 25 10/14/2020    ALKPHOS 182 (H) 10/14/2020    BILITOT 0.5 10/14/2020     Albumin:   Albumin   Date Value Ref Range Status   10/14/2020 1.7 (L) 3.5 - 5.2 g/dL Final     Protein:   Total Protein   Date Value Ref Range Status   10/14/2020 5.6 (L) 6.0 - 8.4 g/dL Final     Lactic acid:   Lab Results   Component Value Date    LACTATE 3.3 (H) 10/11/2020    LACTATE 3.1 (H) 10/08/2020       Significant Imaging: I have reviewed all pertinent imaging results/findings within the past 24 hours.      > 50% of 35 min visit spent in chart review, face to face discussion of goals of care,  symptom assessment, coordination of care and emotional support.    Gretel Pardo, CNS  Palliative Medicine  [facility]

## 2020-10-14 NOTE — NURSING
Initiated PPN per physician's order. Patient dislodges right AC IV site, tip intact. PPN initiated to left forearm site.

## 2020-10-14 NOTE — PROGRESS NOTES
[facility] Medicine  Progress Note    Patient Name: Kami Barbosa  MRN: [id number]  Patient Class: IP- Inpatient   Admission Date: 10/8/2020  Length of Stay: 6 days  Attending Physician: Max Otero MD  Primary Care Provider: Primary Doctor No        Subjective:     Principal Problem:Pneumonia due to COVID-19 virus        HPI:  Ms. Barbosa is a 79-year-old female with past medical history of breast cancer who presented to [facility] on 09/26 with right shoulder pain and shortness of breath x 2 days. She was admitted for COVID pneumonia.  She has since completed a 10 day course of steroids, Remdesivir, and azithromycin.  She has required high-flow oxygen, at 30 L with 50% FiO2 to maintain adequate oxygenation. Hospital course was complicated by right pleural effusion requiring chest tube placement.  Cytology results are not available from pleural cultures.  After resolution of pleural effusion, chest tube was removed. However, shortly thereafter, the patient developed pneumothorax prompting placement of a new chest tube.  Medical history limited by lack of records from outside facility.  She was transferred to [facility] for further management due to evacuation protocol.  Upon my assessment, she appears chronically ill and does not appear in acute distress.  She is conversant, aAAO x3 and has no complaints at this time.  She is satting 95% on 40 L high-flow.  Right chest tube currently in place, site is clean, dry and intact.             Overview/Hospital Course:  With moderate respiratory difficulty but with increasing oxygen requirements. 10/9: 30L --> 45L 100% O2. Patient then switched to bipap with some improvement. Evaluated by critical care, who discussed with patient and family and decision was made to continue bipap and make patient DNI. Palliative care following patient and coordinated visitation by children to see patient. Patient remains  on BiPAP.    Interval History: Seen and examined this morning. No acute events overnight. Patient again does not tolerate the BiPAP. On CF 40 L @ 90% overnight. This morning patient with improved mentation. Able to carry on conversation and communicate her needs, although she tires out. Spent some time with daughter at bedside answering her questions and updating her on plan of care. Considering nutritional deficits, consulting Inpatient Dietician and SLP.     Review of Systems   Constitutional: Negative for fever.   HENT: Negative for congestion and rhinorrhea.    Eyes: Negative for visual disturbance.   Respiratory: Positive for shortness of breath.    Cardiovascular: Negative for chest pain.   Gastrointestinal: Negative for abdominal pain, nausea and vomiting.   Genitourinary: Negative for difficulty urinating.   Musculoskeletal: Negative for arthralgias.   Skin: Negative for color change.   Neurological: Negative for dizziness and light-headedness.   Psychiatric/Behavioral: The patient is nervous/anxious.      Objective:     Vital Signs (Most Recent):  Temp: 98.2 °F (36.8 °C) (10/14/20 0900)  Pulse: 104 (10/14/20 0821)  Resp: (!) 30 (10/14/20 0821)  BP: (!) 167/64 (10/14/20 0900)  SpO2: (!) 89 % (10/14/20 0821) Vital Signs (24h Range):  Temp:  [98.1 °F (36.7 °C)-98.9 °F (37.2 °C)] 98.2 °F (36.8 °C)  Pulse:  [104-115] 104  Resp:  [30-35] 30  SpO2:  [86 %-97 %] 89 %  BP: (160-167)/(64-72) 167/64     Weight: 60.8 kg (134 lb)  Body mass index is 23 kg/m².    Intake/Output Summary (Last 24 hours) at 10/14/2020 1100  Last data filed at 10/13/2020 1800  Gross per 24 hour   Intake 0 ml   Output 300 ml   Net -300 ml      Physical Exam  Vitals signs and nursing note reviewed.   Constitutional:       General: She is in acute distress.      Appearance: She is ill-appearing.   HENT:      Head: Normocephalic and atraumatic.      Nose: Nose normal.   Eyes:      Extraocular Movements: Extraocular movements intact.       Conjunctiva/sclera: Conjunctivae normal.      Pupils: Pupils are equal, round, and reactive to light.   Neck:      Musculoskeletal: Normal range of motion.   Cardiovascular:      Rate and Rhythm: Normal rate and regular rhythm.      Heart sounds: Normal heart sounds. No murmur. No gallop.    Pulmonary:      Effort: Respiratory distress present.      Breath sounds: No wheezing or rales.   Abdominal:      General: Abdomen is flat. There is no distension.      Palpations: Abdomen is soft.      Tenderness: There is no abdominal tenderness. There is no guarding.   Musculoskeletal: Normal range of motion.   Skin:     General: Skin is warm.   Neurological:      General: No focal deficit present.      Mental Status: She is oriented to person, place, and time.         Significant Labs:   BMP:   Recent Labs   Lab 10/14/20  0649   *      K 4.6      CO2 21*   BUN 29*   CREATININE 0.6   CALCIUM 6.9*   MG 2.8*     CBC:   Recent Labs   Lab 10/13/20  0456 10/14/20  0649   WBC 51.35* 48.98*   HGB 11.0* 10.7*   HCT 34.7* 36.9*    216     CMP:   Recent Labs   Lab 10/13/20  0456 10/14/20  0649    142   K 4.6 4.6    108   CO2 22* 21*   * 113*   BUN 25* 29*   CREATININE 0.5 0.6   CALCIUM 7.1* 6.9*   PROT 5.5* 5.6*   ALBUMIN 1.8* 1.7*   BILITOT 0.6 0.5   ALKPHOS 153* 182*   AST 20 25   ALT 13 12   ANIONGAP 11 13   EGFRNONAA >60.0 >60.0     All pertinent labs within the past 24 hours have been reviewed.    Significant Imaging: I have reviewed all pertinent imaging results/findings within the past 24 hours.     CXR(10/14/2020): Patchy increased markings in both lower lung fields are noted with a moderate pleural effusion seen on the right.  Lungs are slightly clearer than yesterday.      Assessment/Plan:      * Pneumonia due to COVID-19 virus  Patient tested positive for COVID-19 virus at OSH, likely the cause of her acute hypoxemic respiratory failure    Plan:  - continuous pulse ox, supportive  O2 therapy with RT consult, intermittent blood gases  - has previously completed remdesivir and dexamethasone course  - Intermittent CXR, currently on HAP coverage with vanc and cefepime (started 10/8) - discontinue 10/14  - 10/9- increasing O2 requirements, started on BiPAP  - 10/11- on and off BiPAP vs CF  - 10/12- CF 40L @ 40%  - 10/14- patient no longer tolerating BiPAP, CF 40L @ 90%  - MICU is aware of patient  - currently DNR/DNI, see palliative note    Malnutrition  Patient unable without PO intake since admission, suffering from weight loss. Albumin ~1.7, trending down since admission    Plan:  - SLP to evaluate patient M-F  - SLP recs to remain NPO  - failed NGT placement 10/14  - will start PPN through PIV  - consider PICC placement for TPN if no clinical improvement in next few days      Hypophosphatemia  Patient's phos since 10/11 has been low. Most recently critically low at 1.0    Plan:  - improved yesterday afternoon to 2.5 after replacement with 30 mmol NaPhosphate IVPB  - Will continue to follow P and replete PRN  - Mg, K WNL      Leukocytosis  WBC 61 on arrival, peaked at 75    Plan:   - down to 48 today  - Trending  - Consider hematology consult if no improvement      Postprocedural pneumothorax  Resolved    Anxiety  Patient c/o anxiety    Plan:  - 0.5 IV ativan q6h PRN  - Use with caution    Palliative care encounter  Patient states she would like to be DNI, family in agreement. See palliative note.    Plan:   - palliative care consulted, appreciate assistance  - Spoke to daughter and updated on medical condition   - Palliative care consulted and met with patient and spoke to patient's daughter  - Patient's   a few years ago from lung cancer-- Daughter and son now in town alternating visits    Pleural effusion  Hx of breast cancer 2 years prior. Recurrent pleural effusions thought 2/2 to malignancy. Chest tube initially placed at OSH, removed, but complicated by pneumothorax and  replaced. Managed by thoracic sx since her stay here.    Plan:  - Thoracic surgery following, appreciate recs  - Thoracic sx managing the chest tube at this time - removed 10/13      VTE Risk Mitigation (From admission, onward)         Ordered     enoxaparin injection 40 mg  Every 24 hours      10/08/20 0930     IP VTE HIGH RISK PATIENT  Once      10/08/20 0930     Place sequential compression device  Until discontinued      10/08/20 0627                Discharge Planning   CHRIS: 10/16/2020     Code Status: DNR   Is the patient medically ready for discharge?: No    Reason for patient still in hospital (select all that apply): Patient unstable, Treatment and Pending disposition  Discharge Plan A: Other(TBD - not appropriate to obtaine RECs at this time)   Discharge Delays: None known at this time        DISPO: Patient not medically stable at this time, still requiring intensive O2 therapy. Plans once medically stable to discharge to care facility vs transfer to OSH closer to home.      Jayden Vega MD, PGY-1  Department of Hospital Medicine   [facility]

## 2020-10-14 NOTE — PT/OT/SLP PROGRESS
"Speech Language Pathology Treatment    Patient Name:  Kami Barbosa   MRN:  [id number]  Admitting Diagnosis: Pneumonia due to COVID-19 virus    Recommendations:                 General Recommendations:  ongoing swallowing assessment  Diet recommendations:  NPO, Liquid Diet Level: NPO   Aspiration Precautions: Alternate means of nutrition/hydration, Frequent oral care, Ice chips and teaspoon sips of water sparingly for comfort and oral-pharyngeal stimulation, Monitor for s/s of aspiration and Strict aspiration precautions   General Precautions: Standard, airborne, aspiration, contact, droplet, fall, NPO  Communication strategies:  provide increased time to answer and go to room if call light pushed    Subjective     "When do I get to go home?"  "Can I quit now?"    Pain/Comfort:  · Pain Rating 1: 0/10    Objective:     Has the patient been evaluated by SLP for swallowing?   Yes  Keep patient NPO? Yes   Current Respiratory Status: nasal cannula(comfort flow)      Pt's mental status and BERNADINE improved today, though endurance poor and pt overall very weak. Pt better able to participate in an oral [facility] exam which revealed generalized weakness and tongue coated with dried secretions.  Oral care provided to clear.  Pt was unable to elicit a dry swallow upon command, but did cough upon command.  Pt accepted the following PO trials: ice chip x 1; thin water 1/2 tsp x1, full tsp x 1, cup sip x 1, straw sips x 3; 1/2 tsp puree x 2. Pt exhibited anterior loss from right corner of mouth for all liquid trials. Pt produced 2-3 swallows for cup and straw sips.  Throat clear x 1 present for straw sips.  Pt with lingual residue present, need for 2nd swallow to clear, slow oral transit, and delayed swallow for pureed trials.  Pt exhibited reduced endurance after limited PO trials and asked "Can I quit now?"  Education provided to pt and daughter (Lorraine) regarding ongoing swallowing assessment, dysphagia, aspiration and its risks, " complications a/w aspiration, continued risks for aspiration and concerns for pt's ability to safely and adequately maintain nutrition/hydration at this time, plan to continue NPO except for ice chips and teaspoon sips of water for comfort and stimulation, and SLP treatment plan and POC.  Pt's daughter expressed understanding. Pt's full understanding unable to be determined.  Nurse and primary team notified of assessment, recs, and plan.     Assessment:     Kami Barbosa is a 79 y.o. female with an SLP diagnosis of Dysphagia.      Goals:   Multidisciplinary Problems     SLP Goals        Problem: SLP Goal    Goal Priority Disciplines Outcome   SLP Goal     SLP Ongoing, Progressing   Description: Speech Language Pathology Goals  Goals expected to be met by 10/20:  1. Pt will participate in ongoing swallowing assessment to determine if safe for PO intake.                    Plan:     · Patient to be seen:  5 x/week   · Plan of Care expires:  11/12/20  · Plan of Care reviewed with:  patient, daughter   · SLP Follow-Up:  Yes       Discharge recommendations:  (tbd)     Time Tracking:     SLP Treatment Date:   10/14/20  Speech Start Time:  1158  Speech Stop Time:  1222     Speech Total Time (min):  24 min    Billable Minutes: Treatment Swallowing Dysfunction 14 and Seld Care/Home Management Training 10    TOMAS Ortega, CCC-SLP  10/14/2020     TOMAS Ortega, CCC-SLP  Speech Language Pathologist  [phone]  10/14/2020

## 2020-10-14 NOTE — ASSESSMENT & PLAN NOTE
Patient's phos since 10/11 has been low. Most recently critically low at 1.0    Plan:  - improved yesterday afternoon to 2.5 after replacement with 30 mmol NaPhosphate IVPB  - Will continue to follow P and replete PRN  - Mg, K WNL

## 2020-10-14 NOTE — CONSULTS
"  [facility]  Adult Nutrition  Consult Note    SUMMARY     Recommendations    1. If pt w/ functioning gut, consider enteral nutrition rather than parenteral. Place NGT & initiate TFs. Rec'd Isosource 1.5 @ 40 mL/hr to provide 1440 calories, 65 grams of protein, 733 mL fluid.  2. If parenteral nutrition still desired, rec'd 165g D, 70g AA + IV Lipids to provide 1341 calories & 70 grams of protein (GIR: 1.88). Electrolytes per MD & pharmacy.   3. RD to monitor & follow-up.    Goals: Meet % EEN, EPN by RD f/u date  Nutrition Goal Status: new  Communication of RD Recs: reviewed with RN    Reason for Assessment    Reason For Assessment: consult, RD follow-up  Diagnosis: other (see comments)(Pneumonia due to COVID-19 virus)  Relevant Medical History: breast cancer  Interdisciplinary Rounds: did not attend    General Information Comments: RD working remotely, pt +COVID-19. Pt remains NPO, per SLP recommendations. Disoriented, unsure of nutrition background. Unsure of PO intake PTA/UBW. No weight history in chart. Pt w/ inadequate energy intake throughout admit, at risk for acute malnutrition. NFPE not complete 2/2 +COVID-19.  Nutrition Discharge Planning: Unclear at this time.     Nutrition/Diet History    Factors Affecting Nutritional Intake: NPO    Anthropometrics    Temp: 98.2 °F (36.8 °C)  Height: 5' 4" (162.6 cm)  Height (inches): 64 in  Weight: 60.8 kg (134 lb 0.6 oz)  Weight (lb): 134.04 lb  Ideal Body Weight (IBW), Female: 120 lb  % Ideal Body Weight, Female (lb): 111.7 %  BMI (Calculated): 23  BMI Grade: 18.5-24.9 - normal  Usual Body Weight (UBW), kg: (PERLA)    Lab/Procedures/Meds    Pertinent Labs Reviewed: reviewed  Pertinent Labs Comments: BUN 29  Pertinent Medications Reviewed: reviewed    Estimated/Assessed Needs    Weight Used For Calorie Calculations: 60.8 kg (134 lb 0.6 oz)     Energy Calorie Requirements (kcal): 1341 kcal/d  Energy Need Method: [place]-St Jeor(1.25 PAL) "     Protein Requirements: 61-73 g/d (1-1.2 g/kg)  Weight Used For Protein Calculations: 60.8 kg (134 lb 0.6 oz)     Estimated Fluid Requirement Method: RDA Method  RDA Method (mL): 1341    Nutrition Prescription Ordered    Current Diet Order: NPO    Evaluation of Received Nutrient/Fluid Intake    Comments: LBM: 10/7    Nutrition Risk    Level of Risk/Frequency of Follow-up: (2x/week)     Assessment and Plan    Nutrition Problem  Inadequate energy intake    Related to (etiology):   Inability to consume sufficient energy    Signs and Symptoms (as evidenced by):   NPO    Interventions(treatment strategy):  Collaboration of nutrition care w/ other providers   Enteral vs parenteral nutrition     Nutrition Diagnosis Status:   New     Monitor and Evaluation    Food and Nutrient Intake: energy intake, food and beverage intake, enteral nutrition intake, parenteral nutrition intake  Food and Nutrient Adminstration: diet order, enteral and parenteral nutrition administration  Physical Activity and Function: nutrition-related ADLs and IADLs  Anthropometric Measurements: weight, weight change  Biochemical Data, Medical Tests and Procedures: glucose/endocrine profile, lipid profile, inflammatory profile, gastrointestinal profile, electrolyte and renal panel  Nutrition-Focused Physical Findings: overall appearance     Malnutrition Assessment    Energy Intake (Malnutrition): less than or equal to 50% for greater than or equal to 5 days     Nutrition Follow-Up    RD Follow-up?: Yes

## 2020-10-14 NOTE — NURSING
Call back from EYAD Narayan about pt HR, no orders given, and he stated that he will be around to see pt

## 2020-10-14 NOTE — ASSESSMENT & PLAN NOTE
Patient tested positive for COVID-19 virus at OSH, likely the cause of her acute hypoxemic respiratory failure    Plan:  - continuous pulse ox, supportive O2 therapy with RT consult, intermittent blood gases  - has previously completed remdesivir and dexamethasone course  - Intermittent CXR, currently on HAP coverage with vanc and cefepime (started 10/8) - discontinue 10/14  - 10/9- increasing O2 requirements, started on BiPAP  - 10/11- on and off BiPAP vs CF  - 10/12- CF 40L @ 40%  - 10/14- patient no longer tolerating BiPAP, CF 40L @ 90%  - MICU is aware of patient  - currently DNR/DNI, see palliative note

## 2020-10-14 NOTE — NURSING
Patient resting in bed, eyes open. Appears more alert today, is able to give one or two word verbal answers, able to nod yes/no to questions correctly. Daughter at bedside, states she spoke with physician this morning on way in to patient's room.

## 2020-10-14 NOTE — PLAN OF CARE
Recommendations     1. If pt w/ functioning gut, consider enteral nutrition rather than parenteral. Place NGT & initiate TFs. Rec'd Isosource 1.5 @ 40 mL/hr to provide 1440 calories, 65 grams of protein, 733 mL fluid.  2. If parenteral nutrition still desired, rec'd 165g D, 70g AA + IV Lipids to provide 1341 calories & 70 grams of protein (GIR: 1.88). Electrolytes per MD & pharmacy.   3. RD to monitor & follow-up.     Goals: Meet % EEN, EPN by RD f/u date  Nutrition Goal Status: new  Communication of RD Recs: reviewed with RN

## 2020-10-14 NOTE — ASSESSMENT & PLAN NOTE
Hx of breast cancer 2 years prior. Recurrent pleural effusions thought 2/2 to malignancy. Chest tube initially placed at OSH, removed, but complicated by pneumothorax and replaced. Managed by thoracic sx since her stay here.    Plan:  - Thoracic surgery following, appreciate recs  - Thoracic sx managing the chest tube at this time - removed 10/13

## 2020-10-14 NOTE — PLAN OF CARE
10/14/20 1523   Post-Acute Status   Post-Acute Authorization Other   Other Status See Comments     SW following patient dc planning needs, Post acute needs to be determined. SW will continue to follow up.        Jade Fox LMSW  [facility]   [id number]

## 2020-10-14 NOTE — ASSESSMENT & PLAN NOTE
Patient states she would like to be DNI, family in agreement. See palliative note.    Plan:   - palliative care consulted, appreciate assistance  - Spoke to daughter and updated on medical condition   - Palliative care consulted and met with patient and spoke to patient's daughter  - Patient's   a few years ago from lung cancer-- Daughter and son now in town alternating visits

## 2020-10-14 NOTE — ASSESSMENT & PLAN NOTE
WBC 61 on arrival, peaked at 75    Plan:   - down to 48 today  - Trending  - Consider hematology consult if no improvement

## 2020-10-14 NOTE — SUBJECTIVE & OBJECTIVE
Interval History: Seen and examined this morning. No acute events overnight. Patient again does not tolerate the BiPAP. On CF 40 L @ 90% overnight. This morning patient with improved mentation. Able to carry on conversation and communicate her needs, although she tires out. Spent some time with daughter at bedside answering her questions and updating her on plan of care. Considering nutritional deficits, consulting Inpatient Dietician and SLP.     Review of Systems   Constitutional: Negative for fever.   HENT: Negative for congestion and rhinorrhea.    Eyes: Negative for visual disturbance.   Respiratory: Positive for shortness of breath.    Cardiovascular: Negative for chest pain.   Gastrointestinal: Negative for abdominal pain, nausea and vomiting.   Genitourinary: Negative for difficulty urinating.   Musculoskeletal: Negative for arthralgias.   Skin: Negative for color change.   Neurological: Negative for dizziness and light-headedness.   Psychiatric/Behavioral: The patient is nervous/anxious.      Objective:     Vital Signs (Most Recent):  Temp: 98.2 °F (36.8 °C) (10/14/20 0900)  Pulse: 104 (10/14/20 0821)  Resp: (!) 30 (10/14/20 0821)  BP: (!) 167/64 (10/14/20 0900)  SpO2: (!) 89 % (10/14/20 0821) Vital Signs (24h Range):  Temp:  [98.1 °F (36.7 °C)-98.9 °F (37.2 °C)] 98.2 °F (36.8 °C)  Pulse:  [104-115] 104  Resp:  [30-35] 30  SpO2:  [86 %-97 %] 89 %  BP: (160-167)/(64-72) 167/64     Weight: 60.8 kg (134 lb)  Body mass index is 23 kg/m².    Intake/Output Summary (Last 24 hours) at 10/14/2020 1100  Last data filed at 10/13/2020 1800  Gross per 24 hour   Intake 0 ml   Output 300 ml   Net -300 ml      Physical Exam  Vitals signs and nursing note reviewed.   Constitutional:       General: She is in acute distress.      Appearance: She is ill-appearing.   HENT:      Head: Normocephalic and atraumatic.      Nose: Nose normal.   Eyes:      Extraocular Movements: Extraocular movements intact.      Conjunctiva/sclera:  Conjunctivae normal.      Pupils: Pupils are equal, round, and reactive to light.   Neck:      Musculoskeletal: Normal range of motion.   Cardiovascular:      Rate and Rhythm: Normal rate and regular rhythm.      Heart sounds: Normal heart sounds. No murmur. No gallop.    Pulmonary:      Effort: Respiratory distress present.      Breath sounds: No wheezing or rales.   Abdominal:      General: Abdomen is flat. There is no distension.      Palpations: Abdomen is soft.      Tenderness: There is no abdominal tenderness. There is no guarding.   Musculoskeletal: Normal range of motion.   Skin:     General: Skin is warm.   Neurological:      General: No focal deficit present.      Mental Status: She is oriented to person, place, and time.         Significant Labs:   BMP:   Recent Labs   Lab 10/14/20  0649   *      K 4.6      CO2 21*   BUN 29*   CREATININE 0.6   CALCIUM 6.9*   MG 2.8*     CBC:   Recent Labs   Lab 10/13/20  0456 10/14/20  0649   WBC 51.35* 48.98*   HGB 11.0* 10.7*   HCT 34.7* 36.9*    216     CMP:   Recent Labs   Lab 10/13/20  0456 10/14/20  0649    142   K 4.6 4.6    108   CO2 22* 21*   * 113*   BUN 25* 29*   CREATININE 0.5 0.6   CALCIUM 7.1* 6.9*   PROT 5.5* 5.6*   ALBUMIN 1.8* 1.7*   BILITOT 0.6 0.5   ALKPHOS 153* 182*   AST 20 25   ALT 13 12   ANIONGAP 11 13   EGFRNONAA >60.0 >60.0     All pertinent labs within the past 24 hours have been reviewed.    Significant Imaging: I have reviewed all pertinent imaging results/findings within the past 24 hours.     CXR(10/14/2020): Patchy increased markings in both lower lung fields are noted with a moderate pleural effusion seen on the right.  Lungs are slightly clearer than yesterday.

## 2020-10-14 NOTE — NURSING
Pt resting, tele shows HR between 110-162, pt showing trigeminey, bigeminey, with frequent PVC, call placed to on-call waiting call back

## 2020-10-14 NOTE — ASSESSMENT & PLAN NOTE
Impression: Pt is a 80 y/o female with past medical history of breast cancer who presented to [facility] on  with right shoulder pain and shortness of breath x 2 days. She was admitted for COVID pneumonia.  She has since completed a 10 day course of steroids, Remdesivir, and azithromycin. Pt is on high flow NC. Pt is sleeping. Not able to fully participate in conversation at this time. Pt is a DNI.     Reason for consult: Spoke to Dr. Raya. [facility] care cosulted for support/advance care-planning. Pt made DNI today per pt's request.     Today:  Spoke to Dr. Otero concerning plan of care for pt. Continuing aggressive treatment. Pt does not want NG tube for feeds. Possible TPN.      Met with daughter, Lorraine who is at bedside. Pt on comfort flow. Opens eyes to voice and able to communicate somewhat.   Support given to pt's daughter at bedside. Daughters is aware of pt's respiratory issues and that she can potentially decline form respiratory standpoint.     10/9/2020  Rounded on pt. Pt is on Bi-PAP. Pt sleeping. Pt opened eyes to voice but drifts back to sleep. Unable to have a meaningful conversation.   Called and spoke to pt's daughter Lorraine over-the-phone. She is bracing for [facility] in [facility]. Daughter is aware pt made DNI today. Per daughter, pt's   of lung cancer a few years ago and they had had the conversation about intubation. Per daughter, she was not surprised pt wanted to be a DNI. Per daughter, she respects her wishes. Updated daughter on new visiting policy. Per Lorraine, pt's daughter Samantha will most likely visit tomorrow due to being closer to [facility].     Pal care number given to daughter to call with any questions, concerns.     Support given.    Plan:   Pt is DNI.   continuing aggressive treatment.   Family is aware of pt's medical condition and code status.   Daughter, Lorraine is at bedside today.   Will follow.

## 2020-10-14 NOTE — NURSING
In to attempt NG tube placement. Patient at receptive to tube being placed, but further into insertion pulls at tube and repeatedly shakes head, yells for nurse to stop and is unable to swallow or pass tube. Daughter informed, agrees to not attempt again at present. Sending Secure Chat to physician group.

## 2020-10-14 NOTE — SUBJECTIVE & OBJECTIVE
Interval History: DNI/on Bi-PAP at 100 %    No past medical history on file.    No past surgical history on file.    Review of patient's allergies indicates:   Allergen Reactions    Penicillins      Tolerates cefepime    Sulfa (sulfonamide antibiotics)        Medications:  Continuous Infusions:   Standard Custom Day One ADULT TPN for patient with NO electrolyte abnormality or NO renal dysfunction (PERIPHERAL)       Scheduled Meds:   ascorbic acid (vitamin C)  500 mg Oral BID    ceFEPime (MAXIPIME) IVPB  2 g Intravenous Q12H    enoxaparin  40 mg Subcutaneous Q24H    fat emulsion 20%  250 mL Intravenous Daily    multivitamin  1 tablet Oral Daily    vancomycin (VANCOCIN) IVPB  15 mg/kg (Order-Specific) Intravenous Q12H     PRN Meds:albuterol-ipratropium, dextrose 50%, dextrose 50%, glucagon (human recombinant), glucose, glucose, lorazepam, sodium chloride 0.9%, Pharmacy to dose Vancomycin consult **AND** vancomycin - pharmacy to dose    Family History     None        Tobacco Use    Smoking status: Not on file   Substance and Sexual Activity    Alcohol use: Not on file    Drug use: Not on file    Sexual activity: Not on file       Review of Systems   Unable to perform ROS: Acuity of condition     Objective:     Vital Signs (Most Recent):  Temp: 98.2 °F (36.8 °C) (10/14/20 0900)  Pulse: 104 (10/14/20 1531)  Resp: (!) 32 (10/14/20 1355)  BP: (!) 167/64 (10/14/20 0900)  SpO2: 96 % (10/14/20 1355) Vital Signs (24h Range):  Temp:  [98.1 °F (36.7 °C)-98.9 °F (37.2 °C)] 98.2 °F (36.8 °C)  Pulse:  [] 104  Resp:  [30-35] 32  SpO2:  [86 %-97 %] 96 %  BP: (160-167)/(64-72) 167/64     Weight: 60.8 kg (134 lb 0.6 oz)  Body mass index is 23.01 kg/m².    Physical Exam  Constitutional:       General: She is sleeping.      Comments: Pt on high flow NC   HENT:      Head: Normocephalic.   Pulmonary:      Comments: On high flow O2  Musculoskeletal: Normal range of motion.   Neurological:      Comments: Pt sleeping. Opens  eyes to touch but drifts back to sleep.          Review of Symptoms    Symptom Assessment (ESAS 0-10 Scale)  Pain:  0  Dyspnea:  0  Anxiety:  0  Nausea:  0  Depression:  0  Anorexia:  0  Fatigue:  0  Insomnia:  0  Restlessness:  0  Agitation:  0         Comments:  Pt reports no pain.           ECOG Performance Status Grade:  4 - Completely disabled    Psychosocial/Cultural: Pt's  is . He  of lung cancer. Pt has two daughters and one son.       Advance Care Planning   Advance Directives:   Living Will: No    LaPOST: No    Medical Power of : No    Agent's Name:  Pt's adult children are next of kin.     Decision Making:  Family answered questions         Significant Labs: All pertinent labs within the past 24 hours have been reviewed.  CBC:   Recent Labs   Lab 10/14/20  0649   WBC 48.98*   HGB 10.7*   HCT 36.9*   MCV 99*        BMP:  Recent Labs   Lab 10/14/20  0649   *      K 4.6      CO2 21*   BUN 29*   CREATININE 0.6   CALCIUM 6.9*   MG 2.8*     LFT:  Lab Results   Component Value Date    AST 25 10/14/2020    ALKPHOS 182 (H) 10/14/2020    BILITOT 0.5 10/14/2020     Albumin:   Albumin   Date Value Ref Range Status   10/14/2020 1.7 (L) 3.5 - 5.2 g/dL Final     Protein:   Total Protein   Date Value Ref Range Status   10/14/2020 5.6 (L) 6.0 - 8.4 g/dL Final     Lactic acid:   Lab Results   Component Value Date    LACTATE 3.3 (H) 10/11/2020    LACTATE 3.1 (H) 10/08/2020       Significant Imaging: I have reviewed all pertinent imaging results/findings within the past 24 hours.

## 2020-10-14 NOTE — NURSING
"Patient with occasional loud hollering, yells "I wanna go to the hospital!"Reminded patient that she is currently in [facility]. Awaiting PPN. Continue to monitor.  "

## 2020-10-14 NOTE — ASSESSMENT & PLAN NOTE
Patient unable without PO intake since admission, suffering from weight loss. Albumin ~1.7, trending down since admission    Plan:  - SLP to evaluate patient M-F  - SLP recs to remain NPO  - failed NGT placement 10/14  - will start PPN through PIV  - consider PICC placement for TPN if no clinical improvement in next few days

## 2020-10-14 NOTE — PLAN OF CARE
Problem: SLP Goal  Goal: SLP Goal  Description: Speech Language Pathology Goals  Goals expected to be met by 10/20:  1. Pt will participate in ongoing swallowing assessment to determine if safe for PO intake.   Outcome: Ongoing, Progressing  Mental status improved today.  Pt still at high risk for aspiration and reduced ability to maintain nutrition orally 2/2 weakness and poor endurance.  Pt OK to receive ice chips and teaspoon sips of water for comfort at this time. Cont POC.   TOMAS Ortega, CCC-SLP  Speech Language Pathologist  [phone]  10/14/2020

## 2020-10-15 NOTE — CARE UPDATE
Discussed GOC with Ms. Barbosa's daughter Samantha at bedside. She is alternating days with Lorraine. She confirmed wishes for DNR/I. We specifically addressed the role of CPR which she declined, along with general escalation of care (eg MICU transfer in the event of further decompensation) which Samantha states would be against their wishes.     We discussed how her worsening mentation and overall clinical status (O2 now up to 40L and 90% FiO2) suggests she is likely actively dying. Will continue current interventions for now, and we discussed a transition to comfort measures if her condition does not improve. Resuscitation order form signed and placed in her chart.    Dmitriy Peña M.D.  Department of Hospital Medicine  [facility]  [phone] (pager)

## 2020-10-15 NOTE — PROGRESS NOTES
[facility] Medicine  Progress Note    Patient Name: Kami Barbosa  MRN: [id number]  Patient Class: IP- Inpatient   Admission Date: 10/8/2020  Length of Stay: 7 days  Attending Physician: Dmitriy Peña MD  Primary Care Provider: Primary Doctor No        Subjective:     Principal Problem:Pneumonia due to COVID-19 virus        HPI:  Ms. Barbosa is a 79-year-old female with past medical history of breast cancer who presented to [facility] on 09/26 with right shoulder pain and shortness of breath x 2 days. She was admitted for COVID pneumonia.  She has since completed a 10 day course of steroids, Remdesivir, and azithromycin.  She has required high-flow oxygen, at 30 L with 50% FiO2 to maintain adequate oxygenation. Hospital course was complicated by right pleural effusion requiring chest tube placement.  Cytology results are not available from pleural cultures.  After resolution of pleural effusion, chest tube was removed. However, shortly thereafter, the patient developed pneumothorax prompting placement of a new chest tube.  Medical history limited by lack of records from outside facility.  She was transferred to [facility] for further management due to evacuation protocol.  Upon my assessment, she appears chronically ill and does not appear in acute distress.  She is conversant, aAAO x3 and has no complaints at this time.  She is satting 95% on 40 L high-flow.  Right chest tube currently in place, site is clean, dry and intact.             Overview/Hospital Course:  With moderate respiratory difficulty but with increasing oxygen requirements. 10/9: 30L --> 45L 100% O2. Patient then switched to bipap with some improvement. Evaluated by critical care, who discussed with patient and family and decision was made to continue bipap and make patient DNI. Palliative care following patient and coordinated visitation by children to see patient. Patient remains on  BiPAP.    Interval History: naeon. Patient remains minimally responsive.    Review of Systems   Unable to perform ROS: Mental status change     Objective:     Vital Signs (Most Recent):  Temp: 98.1 °F (36.7 °C) (10/15/20 0859)  Pulse: 110 (10/15/20 1444)  Resp: (!) 28 (10/15/20 1444)  BP: (!) 172/72 (10/15/20 0859)  SpO2: (!) 94 % (10/15/20 1527) Vital Signs (24h Range):  Temp:  [97.1 °F (36.2 °C)-98.1 °F (36.7 °C)] 98.1 °F (36.7 °C)  Pulse:  [109-112] 110  Resp:  [25-42] 28  SpO2:  [92 %-100 %] 94 %  BP: (153-172)/(69-72) 172/72     Weight: 60.8 kg (134 lb 0.6 oz)  Body mass index is 23.01 kg/m².    Intake/Output Summary (Last 24 hours) at 10/15/2020 1608  Last data filed at 10/15/2020 1300  Gross per 24 hour   Intake 0 ml   Output 800 ml   Net -800 ml      Physical Exam  Vitals signs and nursing note reviewed.   Constitutional:       Appearance: She is ill-appearing.   HENT:      Head: Normocephalic and atraumatic.      Nose: Nose normal.   Eyes:      Extraocular Movements: Extraocular movements intact.      Conjunctiva/sclera: Conjunctivae normal.      Pupils: Pupils are equal, round, and reactive to light.   Neck:      Musculoskeletal: Normal range of motion.   Cardiovascular:      Rate and Rhythm: Normal rate and regular rhythm.      Heart sounds: Normal heart sounds. No murmur. No gallop.    Pulmonary:      Effort: Respiratory distress present.   Abdominal:      General: Abdomen is flat. There is no distension.      Palpations: Abdomen is soft.      Tenderness: There is no abdominal tenderness. There is no guarding.   Musculoskeletal: Normal range of motion.   Skin:     General: Skin is warm.         Significant Labs:   CBC:   Recent Labs   Lab 10/14/20  0649 10/15/20  0600   WBC 48.98* 55.97*   HGB 10.7* 11.0*   HCT 36.9* 34.2*    241     CMP:   Recent Labs   Lab 10/14/20  0649 10/15/20  0600    141   K 4.6 4.7    107   CO2 21* 24   * 156*   BUN 29* 32*   CREATININE 0.6 0.6   CALCIUM  6.9* 6.9*   PROT 5.6* 5.8*   ALBUMIN 1.7* 1.8*   BILITOT 0.5 0.5   ALKPHOS 182* 151*   AST 25 27   ALT 12 15   ANIONGAP 13 10   EGFRNONAA >60.0 >60.0           Assessment/Plan:      * Pneumonia due to COVID-19 virus  Patient tested positive for COVID-19 virus at OSH, likely the cause of her acute hypoxemic respiratory failure    Plan:  - continue supportive care, remains on Com rafiq  - completed remdesivir and dexamethasone course  - completed course vanc and cefepime  - palliative care consulted, appreciate assistance  - currently DNR/DNI    Malnutrition  Patient unable without PO intake since admission, suffering from weight loss. Albumin ~1.7, trending down since admission    Plan:  - failed NGT placement 10/14  - started PPN through PIV, hold on further invasive nutritional support, family in agreement    Hypophosphatemia  Patient's phos since 10/11 has been low. Most recently critically low at 1.0    Plan:  - improved yesterday afternoon to 2.5 after replacement with 30 mmol NaPhosphate IVPB  - Will continue to follow P and replete PRN  - Mg, K WNL      Leukocytosis  WBC 61 on arrival, peaked at 75    Plan:   - Trending    Postprocedural pneumothorax  Resolved    Anxiety  Patient c/o anxiety    Plan:  - 0.5 IV ativan q6h PRN  - Use with caution    Palliative care encounter  Patient states she would like to be DNI, family in agreement. See palliative note.    Plan:   - palliative care consulted, appreciate assistance  - patient currently DNR    Pleural effusion  Hx of breast cancer 2 years prior. Recurrent pleural effusions thought 2/2 to malignancy. Chest tube initially placed at OSH, removed, but complicated by pneumothorax and replaced. Managed by thoracic sx since her stay here.    Plan:  - Thoracic surgery following, appreciate recs  - Thoracic sx managing the chest tube at this time - removed 10/13      VTE Risk Mitigation (From admission, onward)         Ordered     enoxaparin injection 40 mg  Every 24  hours      10/08/20 0930     IP VTE HIGH RISK PATIENT  Once      10/08/20 0930     Place sequential compression device  Until discontinued      10/08/20 0627                Discharge Planning   CHRIS: 10/19/2020     Code Status: DNR   Is the patient medically ready for discharge?: No    Reason for patient still in hospital (select all that apply): Patient unstable  Discharge Plan A: Other(TBD - not appropriate to obtaine RECs at this time)   Discharge Delays: None known at this time              Sven Contreras MD  Department of Hospital Medicine   [facility]

## 2020-10-15 NOTE — CONSULTS
"Responded to spiritual care consult for pt actively dying. Daughter, "zhen" in pt room. Zhen stated, "other family on the way then we will eat dinner". No spiritual needs at this time.  remains available.   "

## 2020-10-15 NOTE — CONSULTS
Therapy with IV vancomycin complete and/or consult discontinued by provider.  Pharmacy will sign off, please re-consult as needed.    Luz Marina Mckeon, PharmD, BCPS  [phone]

## 2020-10-15 NOTE — SUBJECTIVE & OBJECTIVE
Interval History: naeon. Patient remains minimally responsive.    Review of Systems   Unable to perform ROS: Mental status change     Objective:     Vital Signs (Most Recent):  Temp: 98.1 °F (36.7 °C) (10/15/20 0859)  Pulse: 110 (10/15/20 1444)  Resp: (!) 28 (10/15/20 1444)  BP: (!) 172/72 (10/15/20 0859)  SpO2: (!) 94 % (10/15/20 1527) Vital Signs (24h Range):  Temp:  [97.1 °F (36.2 °C)-98.1 °F (36.7 °C)] 98.1 °F (36.7 °C)  Pulse:  [109-112] 110  Resp:  [25-42] 28  SpO2:  [92 %-100 %] 94 %  BP: (153-172)/(69-72) 172/72     Weight: 60.8 kg (134 lb 0.6 oz)  Body mass index is 23.01 kg/m².    Intake/Output Summary (Last 24 hours) at 10/15/2020 1608  Last data filed at 10/15/2020 1300  Gross per 24 hour   Intake 0 ml   Output 800 ml   Net -800 ml      Physical Exam  Vitals signs and nursing note reviewed.   Constitutional:       Appearance: She is ill-appearing.   HENT:      Head: Normocephalic and atraumatic.      Nose: Nose normal.   Eyes:      Extraocular Movements: Extraocular movements intact.      Conjunctiva/sclera: Conjunctivae normal.      Pupils: Pupils are equal, round, and reactive to light.   Neck:      Musculoskeletal: Normal range of motion.   Cardiovascular:      Rate and Rhythm: Normal rate and regular rhythm.      Heart sounds: Normal heart sounds. No murmur. No gallop.    Pulmonary:      Effort: Respiratory distress present.   Abdominal:      General: Abdomen is flat. There is no distension.      Palpations: Abdomen is soft.      Tenderness: There is no abdominal tenderness. There is no guarding.   Musculoskeletal: Normal range of motion.   Skin:     General: Skin is warm.         Significant Labs:   CBC:   Recent Labs   Lab 10/14/20  0649 10/15/20  0600   WBC 48.98* 55.97*   HGB 10.7* 11.0*   HCT 36.9* 34.2*    241     CMP:   Recent Labs   Lab 10/14/20  0649 10/15/20  0600    141   K 4.6 4.7    107   CO2 21* 24   * 156*   BUN 29* 32*   CREATININE 0.6 0.6   CALCIUM 6.9* 6.9*    PROT 5.6* 5.8*   ALBUMIN 1.7* 1.8*   BILITOT 0.5 0.5   ALKPHOS 182* 151*   AST 25 27   ALT 12 15   ANIONGAP 13 10   EGFRNONAA >60.0 >60.0

## 2020-10-15 NOTE — PLAN OF CARE
Currently not stable for discharge. Presently on  Bi-PAP at 100 %. Palliative care involved - has been made DNI per patient wishes - family in agreement however still aggressively treating -  right chest tube remains in place.  Unstable for facility transfer. Will continue to monitor.    Emilie Ansari RN   Case Management  Ext [phone]       10/15/20 1435   Discharge Reassessment   Assessment Type Discharge Planning Reassessment   Provided patient/caregiver education on the expected discharge date and the discharge plan Yes   Do you have any problems affording any of your prescribed medications? No   Post-Acute Status   Post-Acute Authorization Placement   Post-Acute Placement Status Awaiting Internal Medical Clearance   Discharge Delays None known at this time

## 2020-10-15 NOTE — PLAN OF CARE
Pt has had stable VS during shift. Pt had making incomprehensible noises all day Pt has lost her second IV and team would like critical care charge nurse to place a line. Sudhir RN notified said he or someone else will get to it, their unit is kind of busy right now. Pt skin is weeping and hand are cold Medical team 1 has been notified. Daughters a bedside at this time and have been updated on treatment plan. Pt has not had a bowel movement during shift. Pt has been turned q2h as tolerated. Will resume PPN when a new IV is placed. Family would like current plan of care continued. Will continue to monitor patient. All questions answered. Will endorse care to NOC RN.

## 2020-10-15 NOTE — NURSING
Pt arms are weeping and hands are cold. Arms are elevated. Medical team is aware of pt condition.  Will be up to talk to daughter.

## 2020-10-16 PROBLEM — Z51.5 COMFORT MEASURES ONLY STATUS: Status: ACTIVE | Noted: 2020-01-01

## 2020-10-16 NOTE — PT/OT/SLP PROGRESS
Speech Language Pathology/Discharge      Kami Barbosa  MRN: [id number]     SLP acknowledge discontinued SLP orders as pt has transitioned to comfort measures only.     TOMAS Ortega, CCC-SLP  Speech Language Pathologist  [phone]  10/16/2020

## 2020-10-16 NOTE — ASSESSMENT & PLAN NOTE
"Impression: Pt is a 80 y/o female with past medical history of breast cancer who presented to [facility] on 09/26 with right shoulder pain and shortness of breath x 2 days. She was admitted for COVID pneumonia.  She has since completed a 10 day course of steroids, Remdesivir, and azithromycin. Pt is on Bi-Pap Not able to fully participate in conversation at this time. Pt is a DNR    Reason for consult: Spoke to Dr. Ferrari concerning plan of care. Pt declining. Comfort care appropriate.    Today:  Met with pt's daughter Lorraine with .Dr. Ferrari. Daughter updated on pt's declining status and transitioning to comfort care discussed. Per Lorraine, she does not think pt would want to continue to "live like this." Lorraine feels pt is suffering. Pt is moaning and appears uncomfortable.  She wants to speak to her siblings. Per Lorraine, okay for Dr. Ferrari and this provider to call pt's daughtersMaritza.     Maritza updated. She is in agreement pt would not want to live in this condition. She is in agreement to comfort care but stated Lorraine is having a hard time making that decision. Maritza, Marcus, and Lorraine to communicate about plan of care/comfort care. Per Dr. Cortes he will call family back in an hour after they speak to each other.     Plan:   Pt is DNR.   Comfort care discussed. Pt's children to talk together. Dr. Ferrari to call back in an hour to see if family ready to transition to comfort care.   Family is aware of pt's medical condition and code status.   DaughterLorraine is at bedside today.   Will follow.     "

## 2020-10-16 NOTE — PROGRESS NOTES
Spoke to IM 1 resident. MD to call pal care provider when they round on pt.     Gretel WATSON, APRN, AGCNS

## 2020-10-16 NOTE — PROGRESS NOTES
"[facility] - ICU [facility]  Palliative Medicine  Progress Note    Patient Name: Kami Barbosa  MRN: [id number]  Admission Date: 10/8/2020  Hospital Length of Stay: 8 days  Code Status: DNR   Attending Provider: Dmitriy Peña MD  Consulting Provider: SENDY Cedeño  Primary Care Physician: Primary Doctor No  Principal Problem:Pneumonia due to COVID-19 virus    Patient information was obtained from patient and ER records.      Assessment/Plan:     Palliative care encounter  Impression: Pt is a 80 y/o female with past medical history of breast cancer who presented to [facility] 09/[facility] with right shoulder pain and shortness of breath x 2 days. She was admitted for COVID pneumonia.  She has since completed a 10 day course of steroids, Remdesivir, and azithromycin. Pt is on Bi-Pap Not able to fully participate in conversation at this time. Pt is a DNR    Reason for consult: Spoke to Dr. Ferrari concerning plan of care. Pt declining. Comfort care appropriate.    Today:  Met with pt's daughter Lorraine with .Dr. Ferrari. Daughter updated on pt's declining status and transitioning to comfort care discussed. Per Lorraine, she does not think pt would want to continue to "live like this." Lorraine feels pt is suffering. Pt is moaning and appears uncomfortable.  She wants to speak to her siblings. Per Lorraine, okay for Dr. Ferrari and this provider to call pt's daughters, Maritza.     Maritza updated. She is in agreement pt would not want to live in this condition. She is in agreement to comfort care but stated Lorraine is having a hard time making that decision. Maritza, Marcus, and Lorraine to communicate about plan of care/comfort care. Per Dr. Cortes he will call family back in an hour after they speak to each other.     Plan:   Pt is DNR.   Comfort care discussed. Pt's children to talk together. Dr. Ferrari to call back in an hour to see if family ready to transition to comfort care. "   Family is aware of pt's medical condition and code status.   Daughter, Lorraine is at bedside today.   Will follow.           I will follow-up with patient. Please contact us if you have any additional questions.    Subjective:     Chief Complaint: No chief complaint on file.      HPI:   Pt is a 79-year-old female with past medical history of breast cancer who presented to [facility] on 09/26 with right shoulder pain and shortness of breath x 2 days. Per chart review, she was admitted for COVID pneumonia.  She has since completed a 10 day course of steroids, Remdesivir, and azithromycin.  She has required high-flow oxygen, at 30 L with 50% FiO2 to maintain adequate oxygenation. Hospital course was complicated by right pleural effusion requiring chest tube placement.  Cytology results are not available from pleural cultures.  After resolution of pleural effusion, chest tube was removed. However, shortly thereafter, the patient developed pneumothorax prompting placement of a new chest tube.  Medical history limited by lack of records from outside facility.  She was transferred to [facility] for further management due to evacuation protocol.  Upon my assessment, she appears chronically ill and does not appear in acute distress. On admit, she is conversant, AAO x3 and has no complaints at this time.  She was satting 95% on 40 L high-flow.  Right chest tube currently in place, site is clean, dry and intact.    Pt is a DNI    Hospital Course:  No notes on file    Interval History: DNI/on Bi-PAP at 100 %    No past medical history on file.    No past surgical history on file.    Review of patient's allergies indicates:   Allergen Reactions    Penicillins      Tolerates cefepime    Sulfa (sulfonamide antibiotics)        Medications:  Continuous Infusions:   TPN ADULT PERIPHERAL CUSTOM 75 mL/hr at 10/16/20 0159     Scheduled Meds:   ascorbic acid (vitamin C)  500 mg Oral BID     enoxaparin  40 mg Subcutaneous Q24H    multivitamin  1 tablet Oral Daily     PRN Meds:albuterol-ipratropium, dextrose 50%, dextrose 50%, glucagon (human recombinant), glucose, glucose, lorazepam, morphine, sodium chloride 0.9%    Family History     None        Tobacco Use    Smoking status: Not on file   Substance and Sexual Activity    Alcohol use: Not on file    Drug use: Not on file    Sexual activity: Not on file       Review of Systems   Unable to perform ROS: Acuity of condition     Objective:     Vital Signs (Most Recent):  Temp: 97.6 °F (36.4 °C) (10/16/20 0400)  Pulse: 90 (10/16/20 0400)  Resp: (!) 23 (10/16/20 0400)  BP: (!) 129/57 (10/16/20 0400)  SpO2: (!) 89 % (10/16/20 0814) Vital Signs (24h Range):  Temp:  [97.6 °F (36.4 °C)-98 °F (36.7 °C)] 97.6 °F (36.4 °C)  Pulse:  [] 90  Resp:  [22-42] 23  SpO2:  [88 %-96 %] 89 %  BP: (129-148)/(56-67) 129/57     Weight: 60.8 kg (134 lb 0.6 oz)  Body mass index is 23.01 kg/m².    Physical Exam  Constitutional:       Comments: Pt on Bi-Pap   HENT:      Head: Normocephalic.   Pulmonary:      Comments: Pt on 70% high flow.   Musculoskeletal: Normal range of motion.   Neurological:      Comments: Pt moaning. Opens eyes to voice.          Review of Symptoms    Symptom Assessment (ESAS 0-10 Scale)  Pain:  0  Dyspnea:  0  Anxiety:  0  Nausea:  0  Depression:  0  Anorexia:  0  Fatigue:  0  Insomnia:  0  Restlessness:  0  Agitation:  0         Comments:  Pt reports no pain.           ECOG Performance Status Grade:  4 - Completely disabled    Psychosocial/Cultural: Pt's  is . He  of lung cancer. Pt has two daughters and one son.       Advance Care Planning   Advance Directives:   Living Will: No    LaPOST: No    Do Not Resuscitate Status: Yes    Medical Power of : No    Agent's Name:  Pt's adult children are next of kin.     Decision Making:  Family answered questions         Significant Labs: All pertinent labs within the past 24 hours  have been reviewed.  CBC:   Recent Labs   Lab 10/16/20  0514   WBC 49.70*   HGB 9.7*   HCT 32.1*   MCV 96        BMP:  Recent Labs   Lab 10/16/20  0514   *      K 4.2      CO2 27   BUN 35*   CREATININE 0.6   CALCIUM 6.7*     LFT:  Lab Results   Component Value Date    AST 22 10/16/2020    ALKPHOS 145 (H) 10/16/2020    BILITOT 0.4 10/16/2020     Albumin:   Albumin   Date Value Ref Range Status   10/16/2020 1.6 (L) 3.5 - 5.2 g/dL Final     Protein:   Total Protein   Date Value Ref Range Status   10/16/2020 5.3 (L) 6.0 - 8.4 g/dL Final     Lactic acid:   Lab Results   Component Value Date    LACTATE 3.3 (H) 10/11/2020    LACTATE 3.1 (H) 10/08/2020       Significant Imaging: I have reviewed all pertinent imaging results/findings within the past 24 hours.      25 minutes of advance care planning completed.       Gretel Pardo, CNS  Palliative Medicine  [facility] - ICU [facility]

## 2020-10-16 NOTE — ASSESSMENT & PLAN NOTE
Patient unable without PO intake since admission, suffering from weight loss. Albumin ~1.7, trending down since admission    Plan:  - SLP to evaluate patient M-F  - SLP recs to remain NPO  - failed NGT placement 10/14  - will start PPN through PIV  - holding PPN due to comfort care measures

## 2020-10-16 NOTE — PT/OT/SLP PROGRESS
Speech Language Pathology      Kami Barbosa  MRN: [id number]    Patient not seen today secondary to Nursing hold (Comment)(not appropriate for PO trials today - on BiPap with no intention of coming off BiPap today.). Sent Secure Chat message to Dr. Peña to determine if pt is appropriate to continue SLP services.  Awaiting response.    TOMAS Ortega, CCC-SLP     TOMAS Ortega, CCC-SLP  Speech Language Pathologist  [phone]  10/16/2020

## 2020-10-16 NOTE — PLAN OF CARE
Problem: Adult Inpatient Plan of Care  Goal: Plan of Care Review  Outcome: Ongoing, Progressing  Goal: Absence of Hospital-Acquired Illness or Injury  Outcome: Ongoing, Progressing  Goal: Optimal Comfort and Wellbeing  Outcome: Ongoing, Progressing  Goal: Readiness for Transition of Care  Outcome: Ongoing, Progressing  Goal: Rounds/Family Conference  Outcome: Ongoing, Progressing     Problem: Fall Injury Risk  Goal: Absence of Fall and Fall-Related Injury  Outcome: Ongoing, Progressing     Problem: Skin Injury Risk Increased  Goal: Skin Health and Integrity  Outcome: Ongoing, Progressing     Problem: Infection  Goal: Infection Symptom Resolution  Outcome: Ongoing, Progressing     Problem: Coping Ineffective  Goal: Effective Coping  Outcome: Ongoing, Progressing     ASSUMED CARE OF PT. VSS MOANING. ASSESSMENT DONE/CHARTED. PAGED ON CALL MD. REQUESTED IM MORPHINE FOR PT AND REQUESTED THAT ORDERS BE REVIEWED AS PT HAS NOT HAD IV ACCESS FOR MOST OF THE DAY.  MD ORDERED IM AND THEN CHANGED TO SL.  PAGED MD CONCERNING SL ORDER WITH PT ON BIPAP. MD REPORTED THAT PHARMACY SUGGESTED SL DUE TO PAIN CAUSED FROM MORPHINE INJECTION.  CHARGE NURSE REQUESTED THAT CHEMPORT BE ACCESSED. NOTIFIED CHARGE NURSE AND MD THAT IT HAS BEEN REPORTED THROUGH NURSE COMMUNICATION FOR DAYS THAT PER ONCOLOGY, CHEMPORT WAS NOT TO BE ACCESSED. THE DAY SHIFT NURSE REPORTED THAT THE PATIENT HAD TWO MIDLINES PLACED TODAY THAT CAME OUT TODAY DUE TO 3RD SPACING AND WEEPING AND THAT MDs  HAVE BEEN AWARE OF NO IV ACCESS THROUGHOUT DAY, NO PPN HAS BEEN INFUSING AND THAT THEY DID NOT WANT THE CHEMPORT ACCESSED NOR DID THEY WANT FSGs.  CHARGE NURSE AND MD SPOKE VIA PHONE. CHARGE NURSE OBTAINED ORDER TO ACCESS CHEMPORT. CHARGE RN ACCESSED CHEMPORT, GAVE MORPHINE VIA CHEMPORT, AND STARTED PPN.  CONTINUING TO REPOSITION PT Q2 HR THROUGHOUT SHIFT. REPORT GIVEN TO ONCOMING RN.

## 2020-10-16 NOTE — PROGRESS NOTES
[facility] - ICU [facility] Medicine  Progress Note    Patient Name: Kami Barbosa  MRN: [id number]  Patient Class: IP- Inpatient   Admission Date: 10/8/2020  Length of Stay: 8 days  Attending Physician: Dmitriy Peña MD  Primary Care Provider: Primary Doctor No        Subjective:     Principal Problem:Pneumonia due to COVID-19 virus        HPI:  Ms. Barbosa is a 79-year-old female with past medical history of breast cancer who presented to [facility] on 09/26 with right shoulder pain and shortness of breath x 2 days. She was admitted for COVID pneumonia.  She has since completed a 10 day course of steroids, Remdesivir, and azithromycin.  She has required high-flow oxygen, at 30 L with 50% FiO2 to maintain adequate oxygenation. Hospital course was complicated by right pleural effusion requiring chest tube placement.  Cytology results are not available from pleural cultures.  After resolution of pleural effusion, chest tube was removed. However, shortly thereafter, the patient developed pneumothorax prompting placement of a new chest tube.  Medical history limited by lack of records from outside facility.  She was transferred to [facility] for further management due to evacuation protocol.  Upon my assessment, she appears chronically ill and does not appear in acute distress.  She is conversant, aAAO x3 and has no complaints at this time.  She is satting 95% on 40 L high-flow.  Right chest tube currently in place, site is clean, dry and intact.             Overview/Hospital Course:  With moderate respiratory difficulty but with increasing oxygen requirements. 10/9: 30L --> 45L 100% O2. Patient then switched to bipap with some improvement. Evaluated by critical care, who discussed with patient and family and decision was made to continue bipap and make patient DNI. Palliative care following patient and coordinated visitation by children to see patient. Patient remains on  BiPAP.    Interval History: Seen and examined this morning. No acute events overnight. Ongoing discussions with family regarding transition to comfort care.     Review of Systems   Constitutional: Negative for fever.   HENT: Negative for congestion and rhinorrhea.    Eyes: Negative for visual disturbance.   Respiratory: Positive for shortness of breath.    Cardiovascular: Negative for chest pain.   Gastrointestinal: Negative for abdominal pain, nausea and vomiting.   Genitourinary: Negative for difficulty urinating.   Musculoskeletal: Negative for arthralgias.   Skin: Negative for color change.   Neurological: Negative for dizziness and light-headedness.   Psychiatric/Behavioral: The patient is nervous/anxious.      Objective:     Vital Signs (Most Recent):  Temp: 96.4 °F (35.8 °C) (10/16/20 1200)  Pulse: 68 (10/16/20 1200)  Resp: (!) 27 (10/16/20 1200)  BP: (!) 138/59 (10/16/20 1200)  SpO2: (!) 89 % (10/16/20 1200) Vital Signs (24h Range):  Temp:  [96.4 °F (35.8 °C)-98 °F (36.7 °C)] 96.4 °F (35.8 °C)  Pulse:  [] 68  Resp:  [22-31] 27  SpO2:  [88 %-96 %] 89 %  BP: (129-153)/(56-67) 138/59     Weight: 60.8 kg (134 lb 0.6 oz)  Body mass index is 23.01 kg/m².    Intake/Output Summary (Last 24 hours) at 10/16/2020 1248  Last data filed at 10/16/2020 0600  Gross per 24 hour   Intake 0 ml   Output 500 ml   Net -500 ml      Physical Exam  Vitals signs and nursing note reviewed.   Constitutional:       Appearance: She is ill-appearing.   HENT:      Head: Normocephalic and atraumatic.      Nose: Nose normal.   Eyes:      Extraocular Movements: Extraocular movements intact.      Conjunctiva/sclera: Conjunctivae normal.      Pupils: Pupils are equal, round, and reactive to light.   Neck:      Musculoskeletal: Normal range of motion.   Cardiovascular:      Rate and Rhythm: Normal rate and regular rhythm.      Heart sounds: Normal heart sounds. No murmur. No gallop.    Pulmonary:      Effort: Respiratory distress present.    Abdominal:      General: Abdomen is flat. There is no distension.      Palpations: Abdomen is soft.      Tenderness: There is no abdominal tenderness. There is no guarding.   Musculoskeletal: Normal range of motion.   Skin:     General: Skin is warm.         Significant Labs:   BMP:   Recent Labs   Lab 10/15/20  0600 10/16/20  0514   * 170*    142   K 4.7 4.2    106   CO2 24 27   BUN 32* 35*   CREATININE 0.6 0.6   CALCIUM 6.9* 6.7*   MG 2.8*  --      CBC:   Recent Labs   Lab 10/15/20  0600 10/16/20  0514   WBC 55.97* 49.70*   HGB 11.0* 9.7*   HCT 34.2* 32.1*    254     CMP:   Recent Labs   Lab 10/15/20  0600 10/16/20  0514    142   K 4.7 4.2    106   CO2 24 27   * 170*   BUN 32* 35*   CREATININE 0.6 0.6   CALCIUM 6.9* 6.7*   PROT 5.8* 5.3*   ALBUMIN 1.8* 1.6*   BILITOT 0.5 0.4   ALKPHOS 151* 145*   AST 27 22   ALT 15 12   ANIONGAP 10 9   EGFRNONAA >60.0 >60.0     All pertinent labs within the past 24 hours have been reviewed.    Significant Imaging: I have reviewed all pertinent imaging results/findings within the past 24 hours.      Assessment/Plan:      * Pneumonia due to COVID-19 virus  Patient tested positive for COVID-19 virus at OSH, likely the cause of her acute hypoxemic respiratory failure    Plan:  - continuous pulse ox, supportive O2 therapy with RT consult, intermittent blood gases  - has previously completed remdesivir and dexamethasone course  - Intermittent CXR, currently on HAP coverage with vanc and cefepime (started 10/8) - discontinue 10/14  - 10/9- increasing O2 requirements, started on BiPAP  - 10/11- on and off BiPAP vs CF  - 10/12- CF 40L @ 40%  - 10/14- patient no longer tolerating BiPAP, CF 40L @ 90%  - MICU is aware of patient  - currently DNR/DNI, see palliative note    Comfort measures only status  - please see Care Update note from Dr Peña, staff, dated 10/16/20      Malnutrition  Patient unable without PO intake since admission,  suffering from weight loss. Albumin ~1.7, trending down since admission    Plan:  - SLP to evaluate patient M-F  - SLP recs to remain NPO  - failed NGT placement 10/14  - will start PPN through PIV  - holding PPN due to comfort care measures      Hypophosphatemia  Patient's phos since 10/11 has been low. Most recently critically low at 1.0    Plan:  - improved yesterday afternoon to 2.5 after replacement with 30 mmol NaPhosphate IVPB  - Will continue to follow P and replete PRN  - Mg, K WNL      Leukocytosis  WBC 61 on arrival, peaked at 75    Plan:   - down to 48 today  - Trending      Postprocedural pneumothorax  Resolved    Anxiety  Patient c/o anxiety    Plan:  - 0.5 IV ativan q6h PRN  - Use with caution    Palliative care encounter  Patient states she would like to be DNI, family in agreement. See palliative note.    Plan:   - palliative care consulted, appreciate assistance  - Spoke to daughter and updated on medical condition   - Palliative care consulted and met with patient and spoke to patient's daughter  - Patient's   a few years ago from lung cancer-- Daughter and son now in town alternating visits  - at this point, decision has been made to pursue comfort measures only in conjunction with family wishes    Pleural effusion  Hx of breast cancer 2 years prior. Recurrent pleural effusions thought 2/2 to malignancy. Chest tube initially placed at OSH, removed, but complicated by pneumothorax and replaced. Managed by thoracic sx since her stay here.    Plan:  - Thoracic surgery following, appreciate recs  - Thoracic sx managing the chest tube at this time - removed 10/13    VTE Risk Mitigation (From admission, onward)         Ordered     IP VTE HIGH RISK PATIENT  Once      10/08/20 0930                Discharge Planning   CHRIS: 10/19/2020     Code Status: DNR   Is the patient medically ready for discharge?: No    Reason for patient still in hospital (select all that apply): Pending  disposition  Discharge Plan A: Other(TBD - not appropriate to obtaine RECs at this time)   Discharge Delays: None known at this time      Dispo: comfort care measures right now, awaiting CM recs    Jayden Vega MD, PGY-1  Department of Hospital Medicine   [facility] - [facility]

## 2020-10-16 NOTE — CARE UPDATE
Today I spoke with all of Ms. Barbosa's siblings, including Lorraine in the AM in person along with palliative care, regarding her deterioration and the plan going forward. We again discussed the topic of transition to comfort-measures only status as a means of focusing on quality of life rather than quantity. She voiced general agreement but wanted to discuss with her siblings to prior to making any changes. She requested we call her family to update.    I called Maritza, who stated that she was already on board with this transition, and arranged for a conversation with all the siblings and requested we touch base again in an hour after they have a chance to come to a unified decision.     I called Maritza back after noon to follow-up. She stated they had come to a conclusion and want comfort-care only, but requested I touch base with Marcus and Lorraine again to see if they had any additional questions. I called both and confirmed everyone is on the same page.     Orders modified to reflect this change. Nursing staff notified. Visitation policy liberalized per end of life criteria.    Dmitriy Peña M.D.  Department of Hospital Medicine  [facility]  [phone] (pager)

## 2020-10-16 NOTE — ASSESSMENT & PLAN NOTE
Patient states she would like to be DNI, family in agreement. See palliative note.    Plan:   - palliative care consulted, appreciate assistance  - Spoke to daughter and updated on medical condition   - Palliative care consulted and met with patient and spoke to patient's daughter  - Patient's   a few years ago from lung cancer-- Daughter and son now in town alternating visits  - at this point, decision has been made to pursue comfort measures only in conjunction with family wishes

## 2020-10-16 NOTE — SUBJECTIVE & OBJECTIVE
Interval History: DNI/on Bi-PAP at 100 %    No past medical history on file.    No past surgical history on file.    Review of patient's allergies indicates:   Allergen Reactions    Penicillins      Tolerates cefepime    Sulfa (sulfonamide antibiotics)        Medications:  Continuous Infusions:   TPN ADULT PERIPHERAL CUSTOM 75 mL/hr at 10/16/20 0159     Scheduled Meds:   ascorbic acid (vitamin C)  500 mg Oral BID    enoxaparin  40 mg Subcutaneous Q24H    multivitamin  1 tablet Oral Daily     PRN Meds:albuterol-ipratropium, dextrose 50%, dextrose 50%, glucagon (human recombinant), glucose, glucose, lorazepam, morphine, sodium chloride 0.9%    Family History     None        Tobacco Use    Smoking status: Not on file   Substance and Sexual Activity    Alcohol use: Not on file    Drug use: Not on file    Sexual activity: Not on file       Review of Systems   Unable to perform ROS: Acuity of condition     Objective:     Vital Signs (Most Recent):  Temp: 97.6 °F (36.4 °C) (10/16/20 0400)  Pulse: 90 (10/16/20 0400)  Resp: (!) 23 (10/16/20 0400)  BP: (!) 129/57 (10/16/20 0400)  SpO2: (!) 89 % (10/16/20 0814) Vital Signs (24h Range):  Temp:  [97.6 °F (36.4 °C)-98 °F (36.7 °C)] 97.6 °F (36.4 °C)  Pulse:  [] 90  Resp:  [22-42] 23  SpO2:  [88 %-96 %] 89 %  BP: (129-148)/(56-67) 129/57     Weight: 60.8 kg (134 lb 0.6 oz)  Body mass index is 23.01 kg/m².    Physical Exam  Constitutional:       Comments: Pt on Bi-Pap   HENT:      Head: Normocephalic.   Pulmonary:      Comments: Pt on 70% high flow.   Musculoskeletal: Normal range of motion.   Neurological:      Comments: Pt moaning. Opens eyes to voice.          Review of Symptoms    Symptom Assessment (ESAS 0-10 Scale)  Pain:  0  Dyspnea:  0  Anxiety:  0  Nausea:  0  Depression:  0  Anorexia:  0  Fatigue:  0  Insomnia:  0  Restlessness:  0  Agitation:  0         Comments:  Pt reports no pain.           ECOG Performance Status Grade:  4 - Completely  disabled    Psychosocial/Cultural: Pt's  is . He  of lung cancer. Pt has two daughters and one son.       Advance Care Planning   Advance Directives:   Living Will: No    LaPOST: No    Do Not Resuscitate Status: Yes    Medical Power of : No    Agent's Name:  Pt's adult children are next of kin.     Decision Making:  Family answered questions         Significant Labs: All pertinent labs within the past 24 hours have been reviewed.  CBC:   Recent Labs   Lab 10/16/20  0514   WBC 49.70*   HGB 9.7*   HCT 32.1*   MCV 96        BMP:  Recent Labs   Lab 10/16/20  0514   *      K 4.2      CO2 27   BUN 35*   CREATININE 0.6   CALCIUM 6.7*     LFT:  Lab Results   Component Value Date    AST 22 10/16/2020    ALKPHOS 145 (H) 10/16/2020    BILITOT 0.4 10/16/2020     Albumin:   Albumin   Date Value Ref Range Status   10/16/2020 1.6 (L) 3.5 - 5.2 g/dL Final     Protein:   Total Protein   Date Value Ref Range Status   10/16/2020 5.3 (L) 6.0 - 8.4 g/dL Final     Lactic acid:   Lab Results   Component Value Date    LACTATE 3.3 (H) 10/11/2020    LACTATE 3.1 (H) 10/08/2020       Significant Imaging: I have reviewed all pertinent imaging results/findings within the past 24 hours.

## 2020-10-16 NOTE — SUBJECTIVE & OBJECTIVE
Interval History: Seen and examined this morning. No acute events overnight. Ongoing discussions with family regarding transition to comfort care.     Review of Systems   Constitutional: Negative for fever.   HENT: Negative for congestion and rhinorrhea.    Eyes: Negative for visual disturbance.   Respiratory: Positive for shortness of breath.    Cardiovascular: Negative for chest pain.   Gastrointestinal: Negative for abdominal pain, nausea and vomiting.   Genitourinary: Negative for difficulty urinating.   Musculoskeletal: Negative for arthralgias.   Skin: Negative for color change.   Neurological: Negative for dizziness and light-headedness.   Psychiatric/Behavioral: The patient is nervous/anxious.      Objective:     Vital Signs (Most Recent):  Temp: 96.4 °F (35.8 °C) (10/16/20 1200)  Pulse: 68 (10/16/20 1200)  Resp: (!) 27 (10/16/20 1200)  BP: (!) 138/59 (10/16/20 1200)  SpO2: (!) 89 % (10/16/20 1200) Vital Signs (24h Range):  Temp:  [96.4 °F (35.8 °C)-98 °F (36.7 °C)] 96.4 °F (35.8 °C)  Pulse:  [] 68  Resp:  [22-31] 27  SpO2:  [88 %-96 %] 89 %  BP: (129-153)/(56-67) 138/59     Weight: 60.8 kg (134 lb 0.6 oz)  Body mass index is 23.01 kg/m².    Intake/Output Summary (Last 24 hours) at 10/16/2020 1248  Last data filed at 10/16/2020 0600  Gross per 24 hour   Intake 0 ml   Output 500 ml   Net -500 ml      Physical Exam  Vitals signs and nursing note reviewed.   Constitutional:       Appearance: She is ill-appearing.   HENT:      Head: Normocephalic and atraumatic.      Nose: Nose normal.   Eyes:      Extraocular Movements: Extraocular movements intact.      Conjunctiva/sclera: Conjunctivae normal.      Pupils: Pupils are equal, round, and reactive to light.   Neck:      Musculoskeletal: Normal range of motion.   Cardiovascular:      Rate and Rhythm: Normal rate and regular rhythm.      Heart sounds: Normal heart sounds. No murmur. No gallop.    Pulmonary:      Effort: Respiratory distress present.    Abdominal:      General: Abdomen is flat. There is no distension.      Palpations: Abdomen is soft.      Tenderness: There is no abdominal tenderness. There is no guarding.   Musculoskeletal: Normal range of motion.   Skin:     General: Skin is warm.         Significant Labs:   BMP:   Recent Labs   Lab 10/15/20  0600 10/16/20  0514   * 170*    142   K 4.7 4.2    106   CO2 24 27   BUN 32* 35*   CREATININE 0.6 0.6   CALCIUM 6.9* 6.7*   MG 2.8*  --      CBC:   Recent Labs   Lab 10/15/20  0600 10/16/20  0514   WBC 55.97* 49.70*   HGB 11.0* 9.7*   HCT 34.2* 32.1*    254     CMP:   Recent Labs   Lab 10/15/20  0600 10/16/20  0514    142   K 4.7 4.2    106   CO2 24 27   * 170*   BUN 32* 35*   CREATININE 0.6 0.6   CALCIUM 6.9* 6.7*   PROT 5.8* 5.3*   ALBUMIN 1.8* 1.6*   BILITOT 0.5 0.4   ALKPHOS 151* 145*   AST 27 22   ALT 15 12   ANIONGAP 10 9   EGFRNONAA >60.0 >60.0     All pertinent labs within the past 24 hours have been reviewed.    Significant Imaging: I have reviewed all pertinent imaging results/findings within the past 24 hours.

## 2020-10-17 NOTE — NURSING
In bed remain on comfort measures, daughter at bedside. Discussed plan of care for nursing today. Daughter requested to keep telemetry monitor  and oxygen probe on and bedside monitor. Patient on bipap @100 with use of accessory muscles. Will administer PRN morphine to increase comfort. (see MAR for administration time and dose). Morphine 2mg/hr  infusing via Right subclavian port without difficulty. Accessory muscles used while breathing with very deep shallow breaths. Assessment completed and charted. Will continue to monitor for any changes.

## 2020-10-17 NOTE — PLAN OF CARE
Problem: Adult Inpatient Plan of Care  Goal: Plan of Care Review  Outcome: Ongoing, Progressing  Goal: Patient-Specific Goal (Individualization)  Outcome: Ongoing, Progressing  Goal: Absence of Hospital-Acquired Illness or Injury  Outcome: Ongoing, Progressing  Goal: Optimal Comfort and Wellbeing  Outcome: Ongoing, Progressing  Goal: Readiness for Transition of Care  Outcome: Ongoing, Progressing  Goal: Rounds/Family Conference  Outcome: Ongoing, Progressing     Problem: Fall Injury Risk  Goal: Absence of Fall and Fall-Related Injury  Outcome: Ongoing, Progressing     Problem: Skin Injury Risk Increased  Goal: Skin Health and Integrity  Outcome: Ongoing, Progressing     Problem: Infection  Goal: Infection Symptom Resolution  Outcome: Ongoing, Progressing     Problem: Coping Ineffective  Goal: Effective Coping  Outcome: Ongoing, Progressing     Problem: Palliative Care  Goal: Enhanced Quality of Life  Outcome: Ongoing, Progressing       ASSUMED CARE OF PT. 2000 VSS NAD REPOSITIONED. ASSESSMENT DONE/CHARTED. 2100 FAMILY IN ROOM. 2200 REPOSITIONED.  2230 CHARGE NURSE INFORMED NURSE THAT FAMILY REQUESTED O2SAT ALARM TO BE DECREASED TO DECREASE LOW LEVEL ALARMING 2300 FAMILY LEFT. 2330 DR MORAES TO SEE PT. UPDATED. 0000 VSS NAD REPOSITIONED. 0016 CRITICAL ALARM FOR O2SAT RINGING FOR O2SAT 46%. RUSHED IN ROOM. PT PULLED OXYGEN OFF. PALE, UNRESPONSIVE, AND AGONAL BREATHING. BEGAN AMBU-BAGGING PT UNTIL O2SAT WAS WNL. MORPHINE GTT WAS STOPPED SIMUTANEOUSLY.  MEANWHILE RESPIRATORY AND CHARGE NURSE CAME TO ROOM TO ASSIST. CHARGE NURSE REPORTED THAT ALARMS WERE TURNED DOWN TO 83% YET CRITICAL ALARM DID NOT BEEP UNTIL 46%.  NOTIFIED FAMILY, ON CALL MD, AND HOUSE SUPERVISOR. FAMILY REQUESTED THAT ALARMS BE RETURNED TO SETTINGS PRIOR TO CHARGE NURSE CHANGING THEM.  0520 PATIENT O2SAT 82%. MOANING  AND MOVING UNCOMFORTABLY IN THE BED. RESPIRATORY AT BEDSIDE STATING THAT PT IS MAXED ON COMFORT FLOW.  SPOKE WITH ON CALL RESIDENT  CONCERNING PT STATUS. RESIDENT STATED TO PLACE PT ON BIPAP AND THAT FURTHER DECISIONS WILL BE DISCUSSED WITH FAMILY. PT PLACED ON BIPAP PER RT.  NOTIFIED FAMILY. REPOSITIONED. 2785 REPORT GIVEN TO ONCOMING RN

## 2020-10-17 NOTE — ASSESSMENT & PLAN NOTE
Patient tested positive for COVID-19 virus at OSH, likely the cause of her acute hypoxemic respiratory failure    Plan:  - continuous pulse ox, supportive O2 therapy with RT consult, intermittent blood gases  - has previously completed remdesivir and dexamethasone course  - Intermittent CXR, currently on HAP coverage with vanc and cefepime (started 10/8) - discontinue 10/14  - 10/9- increasing O2 requirements, started on BiPAP  - 10/11- on and off BiPAP vs CF  - 10/12- CF 40L @ 40%  - 10/14- patient no longer tolerating BiPAP, CF 40L @ 90%  - MICU is aware of patient  - currently DNR/DNI, see palliative note  - currently comfort care measures only

## 2020-10-17 NOTE — PROGRESS NOTES
[facility] - ICU [facility] Medicine  Progress Note    Patient Name: Kami Barbosa  MRN: [id number]  Patient Class: IP- Inpatient   Admission Date: 10/8/2020  Length of Stay: 9 days  Attending Physician: Dmitriy Peña MD  Primary Care Provider: Primary Doctor No        Subjective:     Principal Problem:Pneumonia due to COVID-19 virus        HPI:  Ms. Barbosa is a 79-year-old female with past medical history of breast cancer who presented to [facility] on 09/26 with right shoulder pain and shortness of breath x 2 days. She was admitted for COVID pneumonia.  She has since completed a 10 day course of steroids, Remdesivir, and azithromycin.  She has required high-flow oxygen, at 30 L with 50% FiO2 to maintain adequate oxygenation. Hospital course was complicated by right pleural effusion requiring chest tube placement.  Cytology results are not available from pleural cultures.  After resolution of pleural effusion, chest tube was removed. However, shortly thereafter, the patient developed pneumothorax prompting placement of a new chest tube.  Medical history limited by lack of records from outside facility.  She was transferred to [facility] for further management due to evacuation protocol.  Upon my assessment, she appears chronically ill and does not appear in acute distress.  She is conversant, aAAO x3 and has no complaints at this time.  She is satting 95% on 40 L high-flow.  Right chest tube currently in place, site is clean, dry and intact.             Overview/Hospital Course:  With moderate respiratory difficulty but with increasing oxygen requirements. 10/9: 30L --> 45L 100% O2. Patient then switched to bipap with some improvement. Evaluated by critical care, who discussed with patient and family and decision was made to continue bipap and make patient DNI. Palliative care following patient and coordinated visitation by children to see patient. Patient remains on  BiPAP.    Interval History: Seen this morning. Family at bedside. Continue comfort measures.    Review of Systems   Reason unable to perform ROS: comfort care measures.     Objective:     Vital Signs (Most Recent):  Temp: 98 °F (36.7 °C) (10/17/20 0905)  Pulse: (!) 140 (10/17/20 0905)  Resp: (!) 27 (10/17/20 0907)  BP: (!) 124/52 (10/17/20 0905)  SpO2: (!) 81 % (10/17/20 0905) Vital Signs (24h Range):  Temp:  [97.5 °F (36.4 °C)-98.3 °F (36.8 °C)] 98 °F (36.7 °C)  Pulse:  [] 140  Resp:  [20-32] 27  SpO2:  [81 %-92 %] 81 %  BP: (117-156)/(52-66) 124/52     Weight: 60.8 kg (134 lb 0.6 oz)  Body mass index is 23.01 kg/m².  No intake or output data in the 24 hours ending 10/17/20 1638   Physical Exam  Vitals reviewed: comfort care measures.         Significant Labs: None    Significant Imaging: None      Assessment/Plan:      * Pneumonia due to COVID-19 virus  Patient tested positive for COVID-19 virus at OSH, likely the cause of her acute hypoxemic respiratory failure    Plan:  - continuous pulse ox, supportive O2 therapy with RT consult, intermittent blood gases  - has previously completed remdesivir and dexamethasone course  - Intermittent CXR, currently on HAP coverage with vanc and cefepime (started 10/8) - discontinue 10/14  - 10/9- increasing O2 requirements, started on BiPAP  - 10/11- on and off BiPAP vs CF  - 10/12- CF 40L @ 40%  - 10/14- patient no longer tolerating BiPAP, CF 40L @ 90%  - MICU is aware of patient  - currently DNR/DNI, see palliative note  - currently comfort care measures only    Comfort measures only status  - please see Care Update note from Dr Peña, staff, dated 10/16/20  - PRN medications for pain, anxiety, discomfort, secretions  - morphine gtt @ 2mg/hr    Malnutrition  Patient unable without PO intake since admission, suffering from weight loss. Albumin ~1.7, trending down since admission    Plan:  - SLP to evaluate patient M-F  - SLP recs to remain NPO  - failed NGT placement  10/14  - will start PPN through PIV  - holding PPN due to comfort care measures      Hypophosphatemia  Patient's phos since 10/11 has been low. Most recently critically low at 1.0    Plan:  - improved yesterday afternoon to 2.5 after replacement with 30 mmol NaPhosphate IVPB  - Will continue to follow P and replete PRN  - Mg, K WNL      Leukocytosis  WBC 61 on arrival, peaked at 75    Plan:   - down to 48 today  - Trending      Postprocedural pneumothorax  Resolved    Anxiety  Patient c/o anxiety    Plan:  - 0.5 IV ativan q6h PRN  - Use with caution    Palliative care encounter  Patient states she would like to be DNI, family in agreement. See palliative note.    Plan:   - palliative care consulted, appreciate assistance  - Spoke to daughter and updated on medical condition   - Palliative care consulted and met with patient and spoke to patient's daughter  - Patient's   a few years ago from lung cancer-- Daughter and son now in town alternating visits  - at this point, decision has been made to pursue comfort measures only in conjunction with family wishes    Pleural effusion  Hx of breast cancer 2 years prior. Recurrent pleural effusions thought 2/2 to malignancy. Chest tube initially placed at OSH, removed, but complicated by pneumothorax and replaced. Managed by thoracic sx since her stay here.    Plan:  - Thoracic surgery following, appreciate recs  - Thoracic sx managing the chest tube at this time - removed 10/13      VTE Risk Mitigation (From admission, onward)         Ordered     IP VTE HIGH RISK PATIENT  Once      10/08/20 0930                Discharge Planning   CHRIS: 10/19/2020     Code Status: DNR   Is the patient medically ready for discharge?: No    Reason for patient still in hospital (select all that apply): Other (specify) Comfort care measures  Discharge Plan A: Other(TBD - not appropriate to obtaine RECs at this time)   Discharge Delays: None known at this time        Jayden Vega,  MD, PGY-1  Department of Hospital Medicine   [facility] - ICU 15 [facility]

## 2020-10-17 NOTE — NURSING
In bed remain on comfort measures, daughter at bedside. Discussed plan of care for nursing today. Daughter requested to keep telemetry monitor  and oxygen probe on and bedside monitor. Patient on bipap @100 with use of accessory muscles. Will administer PRN morphine to increase comfort. (see MAR for administration time and dose). Morphine  infusing via Right subclavian port without difficulty. Accessory muscles used while breathing with very deep shallow breaths. Assessment completed and charted. Will continue to monitor for any changes.

## 2020-10-17 NOTE — SUBJECTIVE & OBJECTIVE
Interval History: Seen this morning. Family at bedside. Continue comfort measures.    Review of Systems   Reason unable to perform ROS: comfort care measures.     Objective:     Vital Signs (Most Recent):  Temp: 98 °F (36.7 °C) (10/17/20 0905)  Pulse: (!) 140 (10/17/20 0905)  Resp: (!) 27 (10/17/20 0907)  BP: (!) 124/52 (10/17/20 0905)  SpO2: (!) 81 % (10/17/20 0905) Vital Signs (24h Range):  Temp:  [97.5 °F (36.4 °C)-98.3 °F (36.8 °C)] 98 °F (36.7 °C)  Pulse:  [] 140  Resp:  [20-32] 27  SpO2:  [81 %-92 %] 81 %  BP: (117-156)/(52-66) 124/52     Weight: 60.8 kg (134 lb 0.6 oz)  Body mass index is 23.01 kg/m².  No intake or output data in the 24 hours ending 10/17/20 1638   Physical Exam  Vitals reviewed: comfort care measures.         Significant Labs: None    Significant Imaging: None

## 2020-10-17 NOTE — NURSING
Patient's family declines to have the bedside monitor shut off at this time.  All alarms were silenced this AM

## 2020-10-17 NOTE — SIGNIFICANT EVENT
Notified by nurse at 0535 regarding patient being hypoxic despite being and full amount comfort flow oxygen. Nurse inquired about BiPAP. Talked with patient's daughter Lorraine over the phone to see if this is what they wanted. Explained that BiPAP is usually uncomfortable for the patient given pressures, however daughter verbalized that this is what she wanted. BiPAP was started at 15/5 40% FiO2.

## 2020-10-17 NOTE — CARE UPDATE
Placed patient on bipap of   15/8 at 100%.  With a curaplex between nose and bipap mask to help with wound on bridge of patients nose.

## 2020-10-17 NOTE — ASSESSMENT & PLAN NOTE
- please see Care Update note from Dr Peña, staff, dated 10/16/20  - PRN medications for pain, anxiety, discomfort, secretions  - morphine gtt @ 2mg/hr

## 2020-10-18 NOTE — NURSING
Patient remains on comfort measures no spontaneous or active movement. Bipap continuous at 100%. Assessment completed. Morphine infusing via port @ 2.5ml/hr. Will continue to continued comfort care.

## 2020-10-18 NOTE — NURSING
Notified by bedside nurse that patient is no longer breathing spontaneously & has no pulse.  Notified MD &  on call.  Patient's daughter at bedside & siblings on the way.

## 2020-10-18 NOTE — CARE UPDATE
Death Note    Called to bedside by patient's nurse. Nursing supervisor notified. Family at bedside.  has been called and is also at bedside.    Patient is not responding to verbal or tactile stimuli. Patient does not have a pupillary reflex. Her pupils are fixed and dilated. No heart or breath sounds heard on auscultation. No respirations. No palpable pulses.     Time of death: 0938    Cause of Death: Hypoxic respiratory failure    Sven Contreras MD

## 2020-10-18 NOTE — NURSING
Post mortem care done on patient. Port de assessed. And body bag placed and tagged appropriately. Care completed with charge nurse on unit.

## 2020-10-18 NOTE — NURSING
Received call @3657 r/t family  Request to be seen in 91. Patient non responsive with no reading on sat monitor. No respiratory effort by patient. No pulse  or b/p noted. Notified Medical team of observation. Team will come to evaluate and pronounce. Family at bedside. OK given to remove BiPAP and other equipment. Will follow hospital protocol for further care.

## 2020-10-18 NOTE — NURSING
Pt unresponsive to stimuli initiated, v/s 62/25, pt on bi-pap per family request sa02 95%, cont on Morphine 2.5mg, Resp 35-38m pt comfort care, will cont to monitor pt and report any issues to family that is currently staying at the [facility].

## 2020-10-18 NOTE — NURSING
Pt comfort care, continues morphine 2.5mg/hr, pt responds to touch, v/s at the moment 66/30, resp 31, HR 78 sao2 93 on bi-pap. Family just left and pt resting, will cont to monitor

## 2020-10-18 NOTE — ASSESSMENT & PLAN NOTE
Patient tested positive for COVID-19 virus at OSH, likely the cause of her acute hypoxemic respiratory failure    Plan:  - continuous pulse ox, supportive O2 therapy with RT consult, intermittent blood gases  - has previously completed remdesivir and dexamethasone course  - Intermittent CXR, currently on HAP coverage with vanc and cefepime (started 10/8) - discontinue 10/14  - 10/9- increasing O2 requirements, started on BiPAP  - 10/11- on and off BiPAP vs CF  - 10/12- CF 40L @ 40%  - 10/14- patient no longer tolerating BiPAP, CF 40L @ 90%  - MICU is aware of patient  - currently DNR/DNI, see palliative note  - currently comfort care measures only  - patient peacefully passed away from hypoxic respiratory failure on morning of 10/18/2020

## 2020-10-18 NOTE — ASSESSMENT & PLAN NOTE
- please see Care Update note from Dr Peña, staff, dated 10/16/20  - PRN medications for pain, anxiety, discomfort, secretions  - morphine gtt @ 2.5mg/hr  - peacefully passed morning of 10/18/2020

## 2020-10-18 NOTE — DISCHARGE SUMMARY
[facility] - ICU [facility] Medicine  Discharge Summary -Death      Patient Name: Kami Barbosa  MRN: [id number]  Admission Date: 10/8/2020  Hospital Length of Stay: 10 days  Discharge Date and Time:  10/18/2020 2:46 PM  Attending Physician: Dmitriy Peña MD   Discharging Provider: Jayden Vega MD  Primary Care Provider: Primary Doctor No      HPI:   Ms. Barbosa is a 79-year-old female with past medical history of breast cancer who presented to [facility] on 09/26 with right shoulder pain and shortness of breath x 2 days. She was admitted for COVID pneumonia.  She has since completed a 10 day course of steroids, Remdesivir, and azithromycin.  She has required high-flow oxygen, at 30 L with 50% FiO2 to maintain adequate oxygenation. Hospital course was complicated by right pleural effusion requiring chest tube placement.  Cytology results are not available from pleural cultures.  After resolution of pleural effusion, chest tube was removed. However, shortly thereafter, the patient developed pneumothorax prompting placement of a new chest tube.  Medical history limited by lack of records from outside facility.  She was transferred to [facility] for further management due to evacuation protocol.  Upon my assessment, she appears chronically ill and does not appear in acute distress.  She is conversant, aAAO x3 and has no complaints at this time.  She is satting 95% on 40 L high-flow.  Right chest tube currently in place, site is clean, dry and intact.     * No surgery found *      Hospital Course:   With moderate respiratory difficulty but with increasing oxygen requirements. 10/9: 30L --> 45L 100% O2. Patient then switched to bipap with some improvement. Evaluated by critical care, who discussed with patient and family and decision was made to continue bipap and make patient DNI. Palliative care following patient and coordinated visitation by children to see patient.  Patient remained on BiPAP, with minimal improvement and increasing FiO2 requirements. Due to patient's poor prognosis, discussions with family were held at which point decision was made to respect family & patient's wishes and transition patient to comfort care measures only. Patient remained on BiPAP per her family's request and kept comfortable with PRN medications and morphine gtt before peacefully passing on morning of 10/18/2020.     Consults:   Consults (From admission, onward)        Status Ordering Provider     Inpatient consult to Cardiothoracic Surgery  Once     Provider:  (Not yet assigned)    Completed ADEOLA SARAH     Inpatient consult to Critical Care Medicine  Once     Provider:  (Not yet assigned)    Completed ADEOLA SARAH     Inpatient consult to Palliative Care  Once     Provider:  (Not yet assigned)    Completed ADEOLA SARAH     Inpatient consult to Registered Dietitian/Nutritionist  Once     Provider:  (Not yet assigned)    Completed OBED PHILLIP     Inpatient consult to Registered Dietitian/Nutritionist  Once     Provider:  (Not yet assigned)    Completed ADEOLA SARAH     Inpatient consult to Registered Dietitian/Nutritionist  Once     Provider:  (Not yet assigned)    Completed LOI BASURTO     Inpatient consult to Spiritual Care  Once     Provider:  (Not yet assigned)    Completed ZEFERINO MILLER          * Pneumonia due to COVID-19 virus  Patient tested positive for COVID-19 virus at OSH, likely the cause of her acute hypoxemic respiratory failure    Plan:  - continuous pulse ox, supportive O2 therapy with RT consult, intermittent blood gases  - has previously completed remdesivir and dexamethasone course  - Intermittent CXR, currently on HAP coverage with vanc and cefepime (started 10/8) - discontinue 10/14  - 10/9- increasing O2 requirements, started on BiPAP  - 10/11- on and off BiPAP vs CF  - 10/12- CF 40L @ 40%  - 10/14- patient no longer tolerating BiPAP, CF  40L @ 90%  - MICU is aware of patient  - currently DNR/DNI, see palliative note  - currently comfort care measures only  - patient peacefully passed away from hypoxic respiratory failure on morning of 10/18/2020    Comfort measures only status  - please see Care Update note from Dr Peña, staff, dated 10/16/20  - PRN medications for pain, anxiety, discomfort, secretions  - morphine gtt @ 2.5mg/hr  - peacefully passed morning of 10/18/2020     Malnutrition  Patient unable without PO intake since admission, suffering from weight loss. Albumin ~1.7, trending down since admission    Plan:  - SLP to evaluate patient M-F  - SLP recs to remain NPO  - failed NGT placement 10/14  - will start PPN through PIV  - holding PPN due to comfort care measures      Palliative care encounter  Patient states she would like to be DNI, family in agreement. See palliative note.    Plan:   - palliative care consulted, appreciate assistance  - Spoke to daughter and updated on medical condition   - Palliative care consulted and met with patient and spoke to patient's daughter  - Patient's   a few years ago from lung cancer-- Daughter and son now in town alternating visits  - at this point, decision has been made to pursue comfort measures only in conjunction with family wishes    Pleural effusion  Hx of breast cancer 2 years prior. Recurrent pleural effusions thought 2/2 to malignancy. Chest tube initially placed at OSH, removed, but complicated by pneumothorax and replaced. Managed by thoracic sx since her stay here.    Plan:  - Thoracic surgery following, appreciate recs  - Thoracic sx managing the chest tube at this time - removed 10/13      Final Active Diagnoses:    Diagnosis Date Noted POA    PRINCIPAL PROBLEM:  Pneumonia due to COVID-19 virus [U07.1, J12.89] 10/08/2020 Yes    Comfort measures only status [Z51.5] 10/16/2020 Not Applicable    Malnutrition [E46] 10/14/2020 Unknown    Advance care planning [Z71.89]  Not  Applicable    COVID-19 [U07.1]  Yes    Acute respiratory failure with hypoxia [J96.01]  Yes    Hypophosphatemia [E83.39] 10/13/2020 Unknown    Goals of care, counseling/discussion [Z71.89]  Not Applicable    Postprocedural pneumothorax [J95.811] 10/11/2020 Yes    Leukocytosis [D72.829] 10/11/2020 Yes    Palliative care encounter [Z51.5] 10/09/2020 Not Applicable    Anxiety [F41.9] 10/09/2020 Yes    Pleural effusion [J90] 10/08/2020 Yes      Problems Resolved During this Admission:       Discharged Condition:     Disposition:     Follow Up:  Follow-up Information     Yaima Sow MD In 2 weeks.    Specialty: Family Medicine  Why: hospital follow-up  Contact information:  [address] [place] [address] [phone]                 Patient Instructions:   No discharge procedures on file.    Significant Diagnostic Studies: None    Pending Diagnostic Studies:     None         Medications:  None (patient  at medical facility)    Indwelling Lines/Drains at time of discharge:   Lines/Drains/Airways     Central Venous Catheter Line                 PowerPort A Cath Single Lumen 10/16/20 0108 right subclavian 2 days                Time spent on the discharge of patient: 35 minutes  Patient was seen and examined on the date of discharge and determined to be suitable for discharge.     DISPO: patient  at [facility] on morning of 10/18/20 at 0938 2/2 to hypoxic respiratory failure    Jayden Vega MD, PGY-1  Department of Hospital Medicine  [facility] - ICU [facility]

## 2020-10-19 NOTE — PHYSICIAN QUERY
PT Name: Kami Barbosa  MR #: [id number]    Nutrition Clarification     CDS: Luz Marina Benavides RN, CCDS         Contact information :ext [phone] [email or web address]       This form is a permanent document in the medical record.     Query Date: October 19, 2020    By submitting this query, we are merely seeking further clarification of documentation.. Please utilize your independent clinical judgment when addressing the question(s) below.    The medical record contains the following:   Indicators  Supporting Clinical Findings Location in Medical Record   x % of Estimated Energy Intake over a time frame from p.o., TF, or TPN % Intake of Estimated Energy Needs: 0 - 25 %  % Meal Intake: 0 - 25 %  Energy Intake (Malnutrition): less than or equal to 50% for greater than or equal to 5 days  RD Consult  10/11/20      RD Consult  10/14/20    Weight Status over a time frame      Subcutaneous Fat and/or Muscle Loss      Fluid Accumulation or Edema     x Wt/BMI/Usual Body Weight BMI (Calculated): 23 RD Consult  10/11/20    Delayed Wound Healing/Failure to Thrive     x Acute or Chronic Illness presented to [facility] on 09/26   presents as transfer from [facility] during [place] evacuation   appears chronically ill   PMH of breast cancer presents as transfer [facility] [place] [facility] for continued management of R pleural effusion and COVID infection H&P 10/8/20 -filed 10/16/20    Medication     x Treatment ONS Boost Plus TID RD Consult 10/11/20   x Other Unable to complete NFPE at this time due to hospital safety precautions for COVID-19. Pt could possibly become malnourished if NPO continues.     Malnutrition  Patient unable without PO intake since admission, suffering from weight loss. Albumin ~1.7, trending down since admission   RD Consult 10/11/20          Hospital Medicine PN 10/14/20     AND / ASPEN Clinical Characteristics  (October 2011)  A minimum of two characteristics is recommended for diagnosing either moderate or severe malnutrition   Mild Malnutrition Moderate Malnutrition Severe Malnutrition   Energy Intake from p.o., TF or TPN. < 75% intake of estimated energy needs for less than 7 days < 75% intake of estimated energy needs for greater than 7 days < 50% intake of estimated energy needs for > 5 days   Weight Loss 1-2% in 1 month  5% in 3 months  7.5% in 6 months  10% in 1 year 1-2 % in 1 week  5% in 1 month  7.5% in 3 months  10% in 6 months  20% in 1 year > 2% in 1 week  > 5% in 1 month  > 7.5% in 3 months  > 10% in 6 months  > 20% in 1 year   Physical Findings     None *Mild subcutaneous fat and/or muscle loss  *Mild fluid accumulation  *Stage II decubitus  *Surgical wound or non-healing wound *Mod/severe subcutaneous fat and/or muscle loss  *Mod/severe fluid accumulation  *Stage III or IV decubitus  *Non-healing surgical wound     Provider, please specify diagnosis or diagnoses associated with above clinical findings.  Please clarify degree of malnutrition.    [   ] Mild Protein-Calorie Malnutrition   [   ] Moderate Protein-Calorie Malnutrition   [  x ] Other degree  Protein-Calorie Malnutrition, please specify,_Severe____   [   ] Malnutrition, Unspecified degree   [  ] Clinically Undetermined

## 2020-10-19 NOTE — PLAN OF CARE
Patient  10/18/2020 @ 0938am   COD: Hypoxic Respiratory Failure    Emilie Ansari RN  Case Management  Ext [phone]

## 2022-06-06 NOTE — PT/OT/SLP PROGRESS
Speech Language Pathology      Kami Barbosa  MRN: [id number]    Patient not seen today secondary to pt on hold per nursing. Pt with increased secretions. Will follow-up per plan of care and if appropriate.     TOMAS Cullen, CCC-SLP  10/15/2020     patient
